# Patient Record
Sex: FEMALE | Race: WHITE | NOT HISPANIC OR LATINO | Employment: FULL TIME | URBAN - METROPOLITAN AREA
[De-identification: names, ages, dates, MRNs, and addresses within clinical notes are randomized per-mention and may not be internally consistent; named-entity substitution may affect disease eponyms.]

---

## 2017-09-26 ENCOUNTER — TELEPHONE (OUTPATIENT)
Dept: MEDICAL GROUP | Age: 42
End: 2017-09-26

## 2017-09-26 NOTE — TELEPHONE ENCOUNTER
Called Jessica Ojeda and left a message to return my call regarding his/her upcoming NP appointment with Milagro Correa M.D..   If patient returns the call please transfer them to extension: [phone]

## 2017-09-26 NOTE — TELEPHONE ENCOUNTER
NEW PATIENT VISIT PRE-VISIT PLANNING    1.  [employer] Patient is checked in Patient Demographics? YES    2.  Immunizations were updated in Epic using WebIZ?: No WebIZ record       •  Web Iz Recommendations:     3.  Is this appointment scheduled as a Hospital Follow-Up? No    4.  Patient is due for the following Health Maintenance Topics:   Health Maintenance Due   Topic Date Due   • IMM DTaP/Tdap/Td Vaccine (1 - Tdap) 03/18/1995   • IMM PNEUMOCOCCAL 19-64 (ADULT) MEDIUM RISK SERIES (1 of 1 - PPSV23) 03/18/1995   • MAMMOGRAM  03/18/2016   • IMM INFLUENZA (1) 09/01/2017       - Patient has completed Mammogram and Pap and HMR Letter faxed to:  Patient will sign a ERLIN at appointment.    5.  Reviewed/Updated the following with patient:       •   Preferred Pharmacy? YES       •   Preferred Lab? YES       •   Medications? YES. Was Abstract Encounter opened and chart updated? YES       •   Social History? YES. Was Abstract Encounter opened and chart updated? YES       •   Family History? YES. Was Abstract Encounter opened and chart updated? YES    6.  Updated Care Team?       •   DME Company (gait device, O2, CPAP, etc.) N\A       •   Other Specialists (eye doctor, derm, GYN, cardiology, endo, etc): NO    7.  Patient was informed to arrive 15 min prior to their scheduled appointment and bring in their medication bottles.

## 2017-10-03 ENCOUNTER — OFFICE VISIT (OUTPATIENT)
Dept: MEDICAL GROUP | Age: 42
End: 2017-10-03
Payer: COMMERCIAL

## 2017-10-03 VITALS
TEMPERATURE: 97.7 F | BODY MASS INDEX: 37.13 KG/M2 | DIASTOLIC BLOOD PRESSURE: 72 MMHG | WEIGHT: 201.8 LBS | SYSTOLIC BLOOD PRESSURE: 118 MMHG | OXYGEN SATURATION: 97 % | HEIGHT: 62 IN | HEART RATE: 78 BPM

## 2017-10-03 DIAGNOSIS — J45.20 MILD INTERMITTENT ASTHMA WITHOUT COMPLICATION: ICD-10-CM

## 2017-10-03 DIAGNOSIS — Z00.00 PREVENTATIVE HEALTH CARE: ICD-10-CM

## 2017-10-03 DIAGNOSIS — R87.612 LGSIL OF CERVIX OF UNDETERMINED SIGNIFICANCE: ICD-10-CM

## 2017-10-03 DIAGNOSIS — Z82.0 FAMILY HISTORY OF HUNTINGTON'S CHOREA: ICD-10-CM

## 2017-10-03 DIAGNOSIS — M25.561 ACUTE PAIN OF RIGHT KNEE: ICD-10-CM

## 2017-10-03 DIAGNOSIS — Z12.31 VISIT FOR SCREENING MAMMOGRAM: ICD-10-CM

## 2017-10-03 DIAGNOSIS — Z23 NEED FOR VACCINATION: ICD-10-CM

## 2017-10-03 DIAGNOSIS — E66.9 OBESITY (BMI 30-39.9): ICD-10-CM

## 2017-10-03 PROBLEM — R87.613 HGSIL ON CYTOLOGIC SMEAR OF CERVIX: Status: ACTIVE | Noted: 2017-10-03

## 2017-10-03 PROBLEM — R87.613 HGSIL ON CYTOLOGIC SMEAR OF CERVIX: Status: RESOLVED | Noted: 2017-10-03 | Resolved: 2017-10-03

## 2017-10-03 PROCEDURE — 90471 IMMUNIZATION ADMIN: CPT | Performed by: FAMILY MEDICINE

## 2017-10-03 PROCEDURE — 90732 PPSV23 VACC 2 YRS+ SUBQ/IM: CPT | Performed by: FAMILY MEDICINE

## 2017-10-03 PROCEDURE — 99204 OFFICE O/P NEW MOD 45 MIN: CPT | Mod: 25 | Performed by: FAMILY MEDICINE

## 2017-10-03 ASSESSMENT — PATIENT HEALTH QUESTIONNAIRE - PHQ9: CLINICAL INTERPRETATION OF PHQ2 SCORE: 0

## 2017-10-03 NOTE — ASSESSMENT & PLAN NOTE
Last pap smear one year ago LGSIL  She continues to complain of pain during coitus  Which is affecting her marriage    Endometrial curettage/cervical biopsy from 2/19/2016    Copies to:  SURGICAL PATHOLOGY CONSULTATION  FINAL DIAGNOSIS:  A. Endocervical curettings:  Benign endocervical mucosa.  No squamous mucosa.  No atypia or dysplasia identified.  B. 10 o'clock cervical biopsy:  Squamous mucosa with focal mild dysplasia/LGSIL.  C. Endometrial biopsy:  Benign proliferative endometrium, no atypia, hyperplasia, or  malignancy.  D. 12 o'clock cervical biopsy:  Minute fragments of benign squamous epithelium, one of which  demonstrates focal chronic inflammation with associated  reactive atypia. No dysplasia identified.  Comment: The history on the specimen requisition indicates a LGSIL on a  previous pap smear. This would positively correlate with the presence  of focal mild dysplasia on the 10:00 cervical biopsy.  Diagnosis performed by:  REY MYLES MD  ------------------------------------------------------------------------

## 2017-10-03 NOTE — ASSESSMENT & PLAN NOTE
The patient is a 42-year-old female who presents to clinic to establish care. She had a significant past medical history of LGSIL, family history of [place]'s chorea, mild intermittent asthma, right knee pain, obesity.   The patient denied any chest pain, no sob, no talbot, no  pnd, no orthopnea, no headache, no changes in vision, no numbness or tingling, no nausea, no diarrhea, no abdominal pain, no fevers, no chills, no bright red blood per rectum, no  difficulty urinating, no burning during micturition, no depressed mood, no other concerns.      NO Cancer  Many family members with DM    NO CAD  MGM with [employer] Chorea

## 2017-10-03 NOTE — PROGRESS NOTES
This medical record contains text that has been entered with the assistance of computer voice recognition and dictation software.  Therefore, it may contain unintended errors in text, spelling, punctuation, or grammar    Chief Complaint   Patient presents with   • Other     see reason for visit       Jessica Ojeda is a 42 y.o. female here evaluation and management of: Establish care, mild intermittent asthma, history of LGSIL, family history of Vesta's, right knee pain, obesity, pain with coitus      HPI:     Mild intermittent asthma without complication  Only excercised induced  Also worse when she has an illness      LGSIL of cervix of undetermined significance    Last pap smear one year ago LGSIL  She continues to complain of pain during coitus  Which is affecting her marriage    Endometrial curettage/cervical biopsy from 2/19/2016    Copies to:  SURGICAL PATHOLOGY CONSULTATION  FINAL DIAGNOSIS:  A. Endocervical curettings:  Benign endocervical mucosa.  No squamous mucosa.  No atypia or dysplasia identified.  B. 10 o'clock cervical biopsy:  Squamous mucosa with focal mild dysplasia/LGSIL.  C. Endometrial biopsy:  Benign proliferative endometrium, no atypia, hyperplasia, or  malignancy.  D. 12 o'clock cervical biopsy:  Minute fragments of benign squamous epithelium, one of which  demonstrates focal chronic inflammation with associated  reactive atypia. No dysplasia identified.  Comment: The history on the specimen requisition indicates a LGSIL on a  previous pap smear. This would positively correlate with the presence  of focal mild dysplasia on the 10:00 cervical biopsy.  Diagnosis performed by:  REY MYLES MD  ------------------------------------------------------------------------      Preventative health care      NO Cancer  Many family members with DM    NO CAD  MGM with [employer] Chorea    Right knee pain  Patient states for the past several months she's been having right knee pain,  associated with hearing polyps, no instability, no history of trauma. She states she has not tried anything for this, has never really addressed this with her primary care provider.    Visit for screening mammogram  Due for mammogram    Current medicines (including changes today)  Current Outpatient Prescriptions   Medication Sig Dispense Refill   • medroxyPROGESTERone (PROVERA) 10 MG Tab Take 1 Tab by mouth every day. 10 Tab 6   • albuterol (VENTOLIN OR PROVENTIL) 108 (90 BASE) MCG/ACT Aero Soln inhalation aerosol Inhale 2 Puffs by mouth every 6 hours as needed for Shortness of Breath. 8.5 g 0   • azithromycin (ZITHROMAX) 250 MG Tab 2 tabs day number one then one tab daily for remaining 4 days 6 Tab 0   • methylPREDNISolone (MEDROL DOSPACK) 4 MG Tab As directed 21 Tab 0   • Loratadine (CLARITIN PO) Take  by mouth.       • albuterol (PROVENTIL) 2.5mg/3ml NEBU 3 mL by Nebulization route every 6 hours as needed for Shortness of Breath. 25 Bullet 0   • medroxyPROGESTERone (PROVERA) 10 MG Tab Take 1 Tab by mouth every day. 10 Tab 3   • medroxyPROGESTERone (PROVERA) 10 MG Tab Take 1 Tab by mouth every day. (Patient not taking: Reported on 9/26/2017) 10 Tab 0   • methylPREDNISolone (MEDROL DOSPACK) 4 MG Tab Use PAULINE as directed (Patient not taking: Reported on 9/26/2017) 21 Tab 0   • Guaifenesin (MUCINEX MAXIMUM STRENGTH) 1200 MG TB12 Take 1 Tab by mouth 2 times a day. (Patient not taking: Reported on 9/26/2017) 15 Tab 1   • Hydrocod Polst-Chlorphen Polst (TUSSIONEX PENNKINETIC ER) 10-8 MG/5ML LQCR Take 5 mL by mouth every 12 hours as needed. No driving or operation of machinery after taking this medication. Do not exceed 10 mL in a 24-hour period. (Patient not taking: Reported on 9/26/2017) 75 mL 0   • ipratropium-albuterol (DUONEB) 0.5-2.5 (3) MG/3ML nebulizer solution 3 mL by Nebulization route every 6 hours as needed for Shortness of Breath or Wheezing. (Patient not taking: Reported on 9/26/2017) 90 mL 0   •  "methylPREDNISolone (MEDROL DOSPACK) 4 MG TABS Take  by mouth every day. follow package directions 1 Each 0   • metaxalone (SKELAXIN) 800 MG TABS Take  by mouth. Prn q 6 hrs       No current facility-administered medications for this visit.      She  has a past medical history of ASTHMA; Bronchitis; Hay fever; and Pneumonia.  She  has no past surgical history on file.  Social History   Substance Use Topics   • Smoking status: Current Every Day Smoker     Packs/day: 0.50     Years: 25.00     Types: Cigarettes   • Smokeless tobacco: Former User     Quit date: 2016      Comment: e-cigarettes   • Alcohol use 0.0 oz/week      Comment: once a year     Social History     Social History Narrative   • No narrative on file     Family History   Problem Relation Age of Onset   • Heart Disease Mother    • Hypertension Mother    • Other Mother      Kidney Disease   • Diabetes Mother    • Diabetes Brother    • Other Maternal Grandmother      Vesta's Chorea     Family Status   Relation Status   • Brother Alive   • Brother Alive   • Father Alive   • Mother Alive   • Brother Alive   • Maternal Grandmother    • Maternal Grandfather    • Paternal Grandmother    • Paternal Grandfather          ROS    Please see hpi     All other systems reviewed and are negative     Objective:     Blood pressure 118/72, pulse 78, temperature 36.5 °C (97.7 °F), height 1.575 m (5' 2.01\"), weight 91.5 kg (201 lb 12.8 oz), SpO2 97 %. Body mass index is 36.9 kg/m².  Physical Exam:    Constitutional: Alert, no distress.  Skin: Warm, dry, good turgor, no rashes in visible areas.  Eye: Equal, round and reactive, conjunctiva clear, lids normal.  ENMT: Lips without lesions, good dentition, oropharynx clear.  Neck: Trachea midline, no masses, no thyromegaly. No cervical or supraclavicular lymphadenopathy.  Respiratory: Unlabored respiratory effort, lungs clear to auscultation, no wheezes, no ronchi.  Cardiovascular: Normal " "S1, S2, no murmur, no edema.  Abdomen: Soft, non-tender, no masses, no hepatosplenomegaly.  Psych: Alert and oriented x3, normal affect and mood.         Right Knee  PositiveThessalay test,  Positive Yasmine,  negative anterior drawer, negative posterior drawer, negative lachmann,  no joint line tenderness,  normal gait, no asymetry of quadriceps,   negative valgus and varus stress,   Negative Pivot, negative Ely test          Assessment and Plan:   The following treatment plan was discussed      1. Need for vaccination  Given today    - Pneumococal Polysaccharide Vaccine 23-Valent =>3yo SQ/IM    2. Preventative health care  Care has been established  We need baseline labs to establish a clinical profile  We reviewed USPSTF guidelines    This patient is due for mammogram  Requested Medical records to be sent to us  Denies intimate partner viloence        3. Mild intermittent asthma without complication  Discussed the goals of treatment,    #1Routine monitoring of symptoms and lung function--Peak expiratory flow rate (PEFR) (performed in the office and/or at home) and spirometry (performed in the office)  #2 Patient education on Asthma Action plan  #3 Controlling environmental factors (trigger factors) and comorbid conditions that contribute to asthma severity  #4 Pharmacologic therapy--Rescue inhaler as needed and daily Advair  #5 Well-controlled asthma is characterized by daytime symptoms no more than twice per week and nighttime symptoms no more than twice per month.  #6Asthma action plan -- The patient's normal PEFR value can be used to maintain personalized \"asthma action plan\"  #7The identification and avoidance of asthma \"triggers\" is a critical component of successful asthma management  #8 Non-selective beta-blockers can trigger severe asthmatic attacks        4. Obesity (BMI 30-39.9)    We discussed agressive lifestyle modifications with weight loss, aerobic exercise, and eating a prudent diet, which  " included avoiding fried foods, using low-fat milk and avoiding simple carbohydrate, making a food diary. If you can't run because of pain do the stationary bike/elipticle or swim 30minutes 3 times per wk, in the beginning and then gradually increase.        6. LGSIL of cervix of undetermined significance  Needs repeat pap    - REFERRAL TO GYNECOLOGY    7. Visit for screening mammogram  Ordered    - MA-SCREEN MAMMO W/CAD-BILAT; Future    8. Family history of Massiel's chorea    - MASSIEL DISEASE MUTATION    9. Acute pain of right knee  Patient was instructed on activity modification ×2 weeks  Use the brace that  was given in clinic for 2 weeks with activity  NSAIDs when necessary  Ice when necessary and compression  Consider intraarticular steroid injection        HEALTH MAINTENANCE: due for mammogram, refused flu    Instructed to Follow up in clinic or ER for worsening symptoms, difficulty breathing, lack of expected recovery, or should new symptoms or problems arise.    Followup: Return in about 2 weeks (around 10/17/2017) for Suture Removal.       Once again this medical record contains text that has been entered with the assistance of computer voice recognition and dictation software.  Therefore, it may contain unintended errors in text, spelling, punctuation, or grammar

## 2017-10-03 NOTE — ASSESSMENT & PLAN NOTE
Patient states for the past several months she's been having right knee pain, associated with hearing polyps, no instability, no history of trauma. She states she has not tried anything for this, has never really addressed this with her primary care provider.

## 2017-10-17 ENCOUNTER — TELEPHONE (OUTPATIENT)
Dept: MEDICAL GROUP | Age: 42
End: 2017-10-17

## 2017-10-18 ENCOUNTER — OFFICE VISIT (OUTPATIENT)
Dept: MEDICAL GROUP | Age: 42
End: 2017-10-18
Payer: COMMERCIAL

## 2017-10-18 VITALS
BODY MASS INDEX: 37.28 KG/M2 | OXYGEN SATURATION: 95 % | DIASTOLIC BLOOD PRESSURE: 70 MMHG | HEART RATE: 83 BPM | SYSTOLIC BLOOD PRESSURE: 124 MMHG | TEMPERATURE: 97.9 F | HEIGHT: 62 IN | WEIGHT: 202.6 LBS

## 2017-10-18 DIAGNOSIS — L30.9 DERMATITIS: ICD-10-CM

## 2017-10-18 DIAGNOSIS — Z12.31 VISIT FOR SCREENING MAMMOGRAM: ICD-10-CM

## 2017-10-18 DIAGNOSIS — E66.9 OBESITY (BMI 30-39.9): ICD-10-CM

## 2017-10-18 DIAGNOSIS — F52.6 SEXUAL PAIN DISORDER: ICD-10-CM

## 2017-10-18 PROCEDURE — 99214 OFFICE O/P EST MOD 30 MIN: CPT | Performed by: FAMILY MEDICINE

## 2017-10-18 RX ORDER — TRIAMCINOLONE ACETONIDE 1 MG/G
OINTMENT TOPICAL
Qty: 1 TUBE | Refills: 1 | Status: SHIPPED | OUTPATIENT
Start: 2017-10-18 | End: 2021-09-23

## 2017-10-18 NOTE — ASSESSMENT & PLAN NOTE
Has severe cramping and bleeding after intercourse  This has been going on for almost a year  She believes is due to recent cervical biopsy  She states that this pain during intercourse and after is affecting her marriage. They do have a marriage counselor. She states of lubrication is not a problem.

## 2017-10-18 NOTE — TELEPHONE ENCOUNTER
ESTABLISHED PATIENT PRE-VISIT PLANNING     Note: Patient will not be contacted if there is no indication to call.     1.  Reviewed notes from the last few office visits within the medical group: Yes    2.  If any orders were placed at last visit or intended to be done for this visit (i.e. 6 mos follow-up), do we have Results/Consult Notes?        •  Labs - Labs were not ordered at last office visit.   Note: If patient appointment is for lab review and patient did not complete labs,                check with provider if OK to reschedule patient until labs completed.       •  Imaging - scheduled       •  Referrals - No referrals were ordered at last office visit.    3. Is this appointment scheduled as a Hospital Follow-Up? No    4.  Immunizations were updated in Epic using WebIZ?: Epic matches WebIZ       •  Web Iz Recommendations: FLU, MMR  and VARICELLA (Chicken Pox)     5.  Patient is due for the following Health Maintenance Topics:   Health Maintenance Due   Topic Date Due   • MAMMOGRAM  03/18/2016           6.  Patient was NOT informed to arrive 15 min prior to their scheduled appointment and bring in their medication bottles.

## 2017-10-18 NOTE — PROGRESS NOTES
This medical record contains text that has been entered with the assistance of computer voice recognition and dictation software.  Therefore, it may contain unintended errors in text, spelling, punctuation, or grammar    Chief Complaint   Patient presents with   • Other     see reason for visit       Jessica Ojeda is a 42 y.o. female here evaluation and management of: sexual pain disorder      HPI:     Sexual pain disorder  Has severe cramping and bleeding after intercourse  This has been going on for almost a year  She believes is due to recent cervical biopsy  She states that this pain during intercourse and after is affecting her marriage. They do have a marriage counselor. She states of lubrication is not a problem.      Dermatitis  The patient states that she's had this itchy skin/flat wart or bug bite for a week now. It itches it's on the palm of her hand right hand and she would like to have it destroyed.    Visit for screening mammogram  Patient states that she has made an appointment schedule her mammogram.    Current medicines (including changes today)  Current Outpatient Prescriptions   Medication Sig Dispense Refill   • triamcinolone acetonide (KENALOG) 0.1 % Ointment Aaa bid prn 1 Tube 1   • medroxyPROGESTERone (PROVERA) 10 MG Tab Take 1 Tab by mouth every day. 10 Tab 6   • albuterol (VENTOLIN OR PROVENTIL) 108 (90 BASE) MCG/ACT Aero Soln inhalation aerosol Inhale 2 Puffs by mouth every 6 hours as needed for Shortness of Breath. 8.5 g 0   • azithromycin (ZITHROMAX) 250 MG Tab 2 tabs day number one then one tab daily for remaining 4 days 6 Tab 0   • methylPREDNISolone (MEDROL DOSPACK) 4 MG Tab As directed 21 Tab 0   • Loratadine (CLARITIN PO) Take  by mouth.       • albuterol (PROVENTIL) 2.5mg/3ml NEBU 3 mL by Nebulization route every 6 hours as needed for Shortness of Breath. 25 Bullet 0   • medroxyPROGESTERone (PROVERA) 10 MG Tab Take 1 Tab by mouth every day. 10 Tab 3   • medroxyPROGESTERone  (PROVERA) 10 MG Tab Take 1 Tab by mouth every day. (Patient not taking: Reported on 9/26/2017) 10 Tab 0   • methylPREDNISolone (MEDROL DOSPACK) 4 MG Tab Use PAULINE as directed (Patient not taking: Reported on 9/26/2017) 21 Tab 0   • Guaifenesin (MUCINEX MAXIMUM STRENGTH) 1200 MG TB12 Take 1 Tab by mouth 2 times a day. (Patient not taking: Reported on 9/26/2017) 15 Tab 1   • Hydrocod Polst-Chlorphen Polst (TUSSIONEX PENNKINETIC ER) 10-8 MG/5ML LQCR Take 5 mL by mouth every 12 hours as needed. No driving or operation of machinery after taking this medication. Do not exceed 10 mL in a 24-hour period. (Patient not taking: Reported on 9/26/2017) 75 mL 0   • ipratropium-albuterol (DUONEB) 0.5-2.5 (3) MG/3ML nebulizer solution 3 mL by Nebulization route every 6 hours as needed for Shortness of Breath or Wheezing. (Patient not taking: Reported on 9/26/2017) 90 mL 0   • methylPREDNISolone (MEDROL DOSPACK) 4 MG TABS Take  by mouth every day. follow package directions 1 Each 0   • metaxalone (SKELAXIN) 800 MG TABS Take  by mouth. Prn q 6 hrs       No current facility-administered medications for this visit.      She  has a past medical history of ASTHMA; Bronchitis; Hay fever; and Pneumonia.  She  has no past surgical history on file.  Social History   Substance Use Topics   • Smoking status: Current Every Day Smoker     Packs/day: 0.50     Years: 25.00     Types: Cigarettes   • Smokeless tobacco: Former User     Quit date: 11/23/2016      Comment: e-cigarettes   • Alcohol use 0.0 oz/week      Comment: once a year     Social History     Social History Narrative   • No narrative on file     Family History   Problem Relation Age of Onset   • Heart Disease Mother    • Hypertension Mother    • Other Mother      Kidney Disease   • Diabetes Mother    • Diabetes Brother    • Other Maternal Grandmother      Gallia's Chorea     Family Status   Relation Status   • Brother Alive   • Brother Alive   • Father Alive   • Mother Alive   •  "Brother Alive   • Maternal Grandmother    • Maternal Grandfather    • Paternal Grandmother    • Paternal Grandfather          ROS    Please see hpi     All other systems reviewed and are negative     Objective:     Blood pressure 124/70, pulse 83, temperature 36.6 °C (97.9 °F), height 1.575 m (5' 2.01\"), weight 91.9 kg (202 lb 9.6 oz), SpO2 95 %. Body mass index is 37.05 kg/m².  Physical Exam:    Constitutional: Alert, no distress.  Skin: Warm, dry, good turgor, no rashes in visible areas.  Eye: Equal, round and reactive, conjunctiva clear, lids normal.  ENMT: Lips without lesions, good dentition, oropharynx clear.  Neck: Trachea midline, no masses, no thyromegaly. No cervical or supraclavicular lymphadenopathy.  Respiratory: Unlabored respiratory effort, lungs clear to auscultation, no wheezes, no ronchi.  Cardiovascular: Normal S1, S2, no murmur, no edema.  Abdomen: Soft, non-tender, no masses, no hepatosplenomegaly.  Psych: Alert and oriented x3, normal affect and mood.          Assessment and Plan:   The following treatment plan was discussed      1. Obesity (BMI 30-39.9)    We discussed subsequent randomized trials, weight loss (via lifestyle or pharmacologic therapy) has been shown to reduce morbidity (reduction in risk factors for cardiovascular disease     The frequently cited Diabetes Prevention Program (DPP) significantly reduced the rate of progression from impaired glucose tolerance to diabetes over a three-year period in participants randomized to intensive lifestyle modification focusing on weight loss       We also discussed agressive lifestyle modifications with weight loss, aerobic exercise, and eating a prudent diet, which  included avoiding fried foods, using low-fat milk and avoiding simple carbohydrate, making a food diary. If you can't run because of pain do the stationary bike/elipticle or swim 30minutes 3 times per wk, in the beginning and then gradually " increase.      2. Visit for screening mammogram  Scheduled per patient     3. Sexual pain disorder  She will schedule  A visit with OB  - REFERRAL TO GYNECOLOGY    4. Dermatitis  Topical kenalog ointment  - triamcinolone acetonide (KENALOG) 0.1 % Ointment; Aaa bid prn  Dispense: 1 Tube; Refill: 1        HEALTH MAINTENANCE:    Instructed to Follow up in clinic or ER for worsening symptoms, difficulty breathing, lack of expected recovery, or should new symptoms or problems arise.    Followup: Return in about 2 months (around 12/18/2017) for Reevaluation.       Once again this medical record contains text that has been entered with the assistance of computer voice recognition and dictation software.  Therefore, it may contain unintended errors in text, spelling, punctuation, or grammar

## 2017-10-18 NOTE — ASSESSMENT & PLAN NOTE
The patient states that she's had this itchy skin/flat wart or bug bite for a week now. It itches it's on the palm of her hand right hand and she would like to have it destroyed.

## 2017-10-31 ENCOUNTER — HOSPITAL ENCOUNTER (OUTPATIENT)
Dept: RADIOLOGY | Facility: MEDICAL CENTER | Age: 42
End: 2017-10-31
Attending: FAMILY MEDICINE
Payer: COMMERCIAL

## 2017-10-31 DIAGNOSIS — Z12.31 VISIT FOR SCREENING MAMMOGRAM: ICD-10-CM

## 2017-10-31 PROCEDURE — G0202 SCR MAMMO BI INCL CAD: HCPCS

## 2017-11-01 DIAGNOSIS — R92.8 ABNORMAL MAMMOGRAM: ICD-10-CM

## 2017-11-02 ENCOUNTER — TELEPHONE (OUTPATIENT)
Dept: MEDICAL GROUP | Age: 42
End: 2017-11-02

## 2017-11-02 NOTE — LETTER
November 7, 2017        Jessica Ojeda  [address] [place] [address]        Dear Jessica:    Dr. Correa's office has been unable to reach you. Please call our office at [phone]. Thank you.      If you have any questions or concerns, please don't hesitate to call.        Sincerely,        Felix Connors Ass't      Electronically Signed

## 2017-11-02 NOTE — TELEPHONE ENCOUNTER
----- Message from Kb Humphrey M.D. sent at 11/1/2017  2:42 PM PDT -----  Please call patient to inform her that the radiologist recommends getting a better look at the left breast with an ultrasound.   There is nothing to panic about this is just to get a better image.  Order placed.       Kb Correa MD  [facility], [address] [address]

## 2017-11-02 NOTE — TELEPHONE ENCOUNTER
Phone Number Called: [phone] (home)     Message: Left message for the patient to call us back regarding the note below.    Left Message for patient to call back: yes

## 2017-11-03 NOTE — TELEPHONE ENCOUNTER
Phone Number Called: [phone] (home)     Message: called pt left message for pt to call back.     Left Message for patient to call back: yes

## 2017-11-07 ENCOUNTER — HOSPITAL ENCOUNTER (OUTPATIENT)
Dept: RADIOLOGY | Facility: MEDICAL CENTER | Age: 42
End: 2017-11-07
Attending: FAMILY MEDICINE
Payer: COMMERCIAL

## 2017-11-07 DIAGNOSIS — R92.8 ABNORMAL MAMMOGRAM: ICD-10-CM

## 2017-11-07 PROCEDURE — G0206 DX MAMMO INCL CAD UNI: HCPCS | Mod: LT

## 2017-11-07 PROCEDURE — 76642 ULTRASOUND BREAST LIMITED: CPT | Mod: LT

## 2017-12-19 ENCOUNTER — OFFICE VISIT (OUTPATIENT)
Dept: MEDICAL GROUP | Facility: PHYSICIAN GROUP | Age: 42
End: 2017-12-19
Payer: COMMERCIAL

## 2017-12-19 VITALS
OXYGEN SATURATION: 94 % | RESPIRATION RATE: 20 BRPM | BODY MASS INDEX: 35.7 KG/M2 | HEART RATE: 94 BPM | SYSTOLIC BLOOD PRESSURE: 122 MMHG | HEIGHT: 62 IN | DIASTOLIC BLOOD PRESSURE: 90 MMHG | TEMPERATURE: 99.2 F | WEIGHT: 194 LBS

## 2017-12-19 DIAGNOSIS — J40 BRONCHITIS: ICD-10-CM

## 2017-12-19 DIAGNOSIS — R52 BODY ACHES: ICD-10-CM

## 2017-12-19 LAB
FLUAV+FLUBV AG SPEC QL IA: NEGATIVE
INT CON NEG: NEGATIVE
INT CON POS: POSITIVE

## 2017-12-19 PROCEDURE — 87804 INFLUENZA ASSAY W/OPTIC: CPT | Performed by: INTERNAL MEDICINE

## 2017-12-19 PROCEDURE — 99203 OFFICE O/P NEW LOW 30 MIN: CPT | Mod: 25 | Performed by: INTERNAL MEDICINE

## 2017-12-19 PROCEDURE — 94640 AIRWAY INHALATION TREATMENT: CPT | Performed by: INTERNAL MEDICINE

## 2017-12-19 RX ORDER — IPRATROPIUM BROMIDE AND ALBUTEROL SULFATE 2.5; .5 MG/3ML; MG/3ML
3 SOLUTION RESPIRATORY (INHALATION) ONCE
Status: COMPLETED | OUTPATIENT
Start: 2017-12-19 | End: 2017-12-19

## 2017-12-19 RX ORDER — BENZONATATE 100 MG/1
100 CAPSULE ORAL 3 TIMES DAILY PRN
Qty: 30 CAP | Refills: 0 | Status: SHIPPED | OUTPATIENT
Start: 2017-12-19 | End: 2018-09-20

## 2017-12-19 RX ADMIN — IPRATROPIUM BROMIDE AND ALBUTEROL SULFATE 3 ML: 2.5; .5 SOLUTION RESPIRATORY (INHALATION) at 15:47

## 2017-12-19 NOTE — ASSESSMENT & PLAN NOTE
Has bouts of bronchitis every year. Symptoms started yesterday and started feeling feverish. Around 10 pm last night started coughing. Cough is dry. Had diarrhea all day yesterday. Had constant diarrhea, watery, this morning with burning. Had some blood with wiping from chafing from the constant wiping. Took immodium with some relief of her diarrhea. Denies vomiting. Having subjective fevers and chills. Feels like she's under water. Didn't get her flu shot this year. Having violent dry cough with shortness of breath after. Has been using her inhaler (used it twice yesterday and 4-5 times today) with some relief. Her son was sick a week ago.

## 2017-12-19 NOTE — PROGRESS NOTES
PRIMARY CARE CLINIC FOLLOW UP VISIT  Chief Complaint   Patient presents with   • Cough     pt gets bronchitis every year    • Diarrhea   • Sweats     History of Present Illness     Bronchitis  Has bouts of bronchitis every year. Symptoms started yesterday and started feeling feverish. Around 10 pm last night started coughing. Cough is dry. Had diarrhea all day yesterday. Had constant diarrhea, watery, this morning with burning. Had some blood with wiping from chafing from the constant wiping. Took immodium with some relief of her diarrhea. Denies vomiting. Having subjective fevers and chills. Feels like she's under water. Didn't get her flu shot this year. Having violent dry cough with shortness of breath after. Has been using her inhaler (used it twice yesterday and 4-5 times today) with some relief. Her son was sick a week ago.     Current Outpatient Prescriptions   Medication Sig Dispense Refill   • benzonatate (TESSALON) 100 MG Cap Take 1 Cap by mouth 3 times a day as needed for Cough. 30 Cap 0   • albuterol (VENTOLIN OR PROVENTIL) 108 (90 BASE) MCG/ACT Aero Soln inhalation aerosol Inhale 2 Puffs by mouth every 6 hours as needed for Shortness of Breath. 8.5 g 0   • Loratadine (CLARITIN PO) Take  by mouth.       • albuterol (PROVENTIL) 2.5mg/3ml NEBU 3 mL by Nebulization route every 6 hours as needed for Shortness of Breath. 25 Bullet 0   • triamcinolone acetonide (KENALOG) 0.1 % Ointment Aaa bid prn 1 Tube 1   • medroxyPROGESTERone (PROVERA) 10 MG Tab Take 1 Tab by mouth every day. (Patient not taking: Reported on 9/26/2017) 10 Tab 0   • methylPREDNISolone (MEDROL DOSPACK) 4 MG Tab Use PAULINE as directed (Patient not taking: Reported on 9/26/2017) 21 Tab 0   • Guaifenesin (MUCINEX MAXIMUM STRENGTH) 1200 MG TB12 Take 1 Tab by mouth 2 times a day. (Patient not taking: Reported on 9/26/2017) 15 Tab 1   • Hydrocod Polst-Chlorphen Polst (TUSSIONEX PENNKINETIC ER) 10-8 MG/5ML LQCR Take 5 mL by mouth every 12 hours as  "needed. No driving or operation of machinery after taking this medication. Do not exceed 10 mL in a 24-hour period. (Patient not taking: Reported on 2017) 75 mL 0   • ipratropium-albuterol (DUONEB) 0.5-2.5 (3) MG/3ML nebulizer solution 3 mL by Nebulization route every 6 hours as needed for Shortness of Breath or Wheezing. (Patient not taking: Reported on 2017) 90 mL 0   • methylPREDNISolone (MEDROL DOSPACK) 4 MG TABS Take  by mouth every day. follow package directions 1 Each 0   • metaxalone (SKELAXIN) 800 MG TABS Take  by mouth. Prn q 6 hrs       No current facility-administered medications for this visit.      Past Medical History:   Diagnosis Date   • ASTHMA    • Bronchitis    • Hay fever    • Pneumonia     as child     Past Surgical History:   Procedure Laterality Date   • PB ENLARGE BREAST WITH IMPLANT       Social History   Substance Use Topics   • Smoking status: Current Every Day Smoker     Packs/day: 0.50     Years: 25.00     Types: Cigarettes   • Smokeless tobacco: Former User     Quit date: 2016      Comment: e-cigarettes   • Alcohol use 0.0 oz/week      Comment: once a year     Social History     Social History Narrative   • No narrative on file     Family History   Problem Relation Age of Onset   • Heart Disease Mother    • Hypertension Mother    • Other Mother      Kidney Disease   • Diabetes Mother    • Diabetes Brother    • Other Maternal Grandmother      [place]'s Chorea     Family Status   Relation Status   • Brother Alive   • Brother Alive   • Father Alive   • Mother Alive   • Brother Alive   • Maternal Grandmother    • Maternal Grandfather    • Paternal Grandmother    • Paternal Grandfather      Allergies: Pcn [penicillins]    ROS  As per HPI above. All other systems reviewed and negative.        Objective   Blood pressure 122/90, pulse 94, temperature 37.3 °C (99.2 °F), resp. rate 20, height 1.575 m (5' 2\"), weight 88 kg (194 lb), SpO2 94 %. " Body mass index is 35.48 kg/m².    General: alert and oriented, appears acutely ill   HEENT: Normocephalic, atraumatic. No thyroid masses. Oropharynx clear with mild injection    Cardiovascular: regular rate and rhythm, normal S1/S2  Pulmonary: lungs clear to auscultation bilaterally, interrupted by dry coughing fits  Lymphatics: no cervical or supraclavicular lymphadenopathy   Skin: warm and dry, no lesions or rashes  Psychiatric: appropriate mood and affect. Good insight and appropriate judgment     Assessment and Plan   The following treatment plan was discussed     1. Bronchitis  Flu swab negative. Offered reassurance and that antibiotics are more harmful than beneficial; discussed risks including clostridium difficile. Given neb in office but told her to limit use at home. Encouraged rest, aggressive hydration. Given prescription for tessalon 100 mg tid prn. Encouraged smoking cessation   - ipratropium-albuterol (DUONEB) nebulizer solution 3 mL; 3 mL by Nebulization route Once.    Healthcare maintenance     There are no preventive care reminders to display for this patient.    Return if symptoms worsen or fail to improve.    Ignacio Houston MD  Internal Medicine  [facility]

## 2017-12-20 NOTE — PATIENT INSTRUCTIONS
· Be sure to rest and hydrate aggressively  · You have been prescribed a medication called tessalon which you may take up to three times a day for cough

## 2018-01-02 ENCOUNTER — HOSPITAL ENCOUNTER (OUTPATIENT)
Facility: MEDICAL CENTER | Age: 43
End: 2018-01-02
Attending: OBSTETRICS & GYNECOLOGY
Payer: COMMERCIAL

## 2018-01-02 ENCOUNTER — GYNECOLOGY VISIT (OUTPATIENT)
Dept: OBGYN | Facility: MEDICAL CENTER | Age: 43
End: 2018-01-02
Payer: COMMERCIAL

## 2018-01-02 VITALS
HEIGHT: 62 IN | WEIGHT: 193 LBS | DIASTOLIC BLOOD PRESSURE: 84 MMHG | BODY MASS INDEX: 35.51 KG/M2 | SYSTOLIC BLOOD PRESSURE: 128 MMHG

## 2018-01-02 DIAGNOSIS — R10.2 PELVIC PAIN IN FEMALE: ICD-10-CM

## 2018-01-02 DIAGNOSIS — Z11.51 SCREENING FOR HUMAN PAPILLOMAVIRUS (HPV): ICD-10-CM

## 2018-01-02 DIAGNOSIS — Z01.419 WELL WOMAN EXAM WITH ROUTINE GYNECOLOGICAL EXAM: ICD-10-CM

## 2018-01-02 DIAGNOSIS — Z12.4 ROUTINE CERVICAL SMEAR: ICD-10-CM

## 2018-01-02 PROCEDURE — 87624 HPV HI-RISK TYP POOLED RSLT: CPT

## 2018-01-02 PROCEDURE — 99396 PREV VISIT EST AGE 40-64: CPT | Performed by: OBSTETRICS & GYNECOLOGY

## 2018-01-02 PROCEDURE — 88175 CYTOPATH C/V AUTO FLUID REDO: CPT

## 2018-01-03 DIAGNOSIS — Z11.51 SCREENING FOR HUMAN PAPILLOMAVIRUS (HPV): ICD-10-CM

## 2018-01-03 DIAGNOSIS — Z01.419 WELL WOMAN EXAM WITH ROUTINE GYNECOLOGICAL EXAM: ICD-10-CM

## 2018-01-03 DIAGNOSIS — Z12.4 ROUTINE CERVICAL SMEAR: ICD-10-CM

## 2018-01-03 NOTE — PROGRESS NOTES
ANNUAL  Gynecologic Exam      HPI Comments:   42 year old  presents for well woman exam.   Patient's last menstrual period was 2017.  Periods are 28 day cycle lasting 5-7 days  (+) dyspareunia past 2 years. Sharp pain in her vagina during intercourse. (+) dryness  (+) menstrual cramps every other cycle  Non smoker  No method of BC now. She has a 19 year old son  Denies family history of breast/ovarian cancer    Review of Systems   Pertinent positives documented in HPI and all other systems reviewed & are negative    All PMH, PSH, allergies, social history and FH reviewed and updated today:  Past Medical History:   Diagnosis Date   • ASTHMA    • Bronchitis    • Hay fever    • Pneumonia     as child     Past Surgical History:   Procedure Laterality Date   • PB ENLARGE BREAST WITH IMPLANT       Pcn [penicillins]  Social History     Social History   • Marital status: Single     Spouse name: N/A   • Number of children: N/A   • Years of education: N/A     Social History Main Topics   • Smoking status: Current Every Day Smoker     Packs/day: 0.50     Years: 25.00     Types: Cigarettes   • Smokeless tobacco: Former User     Quit date: 2016      Comment: e-cigarettes   • Alcohol use 0.0 oz/week      Comment: once a year   • Drug use: No   • Sexual activity: Yes     Partners: Male     Other Topics Concern   • Not on file     Social History Narrative   • No narrative on file     Family History   Problem Relation Age of Onset   • Heart Disease Mother    • Hypertension Mother    • Other Mother      Kidney Disease   • Diabetes Mother    • Diabetes Brother    • Other Maternal Grandmother      [place]'s Chorea     Medications:   Current Outpatient Prescriptions Ordered in [facility]   Medication Sig Dispense Refill   • albuterol (VENTOLIN OR PROVENTIL) 108 (90 BASE) MCG/ACT Aero Soln inhalation aerosol Inhale 2 Puffs by mouth every 6 hours as needed for Shortness of Breath. 8.5 g 0   • albuterol (PROVENTIL)  "2.5mg/3ml NEBU 3 mL by Nebulization route every 6 hours as needed for Shortness of Breath. 25 Bullet 0   • benzonatate (TESSALON) 100 MG Cap Take 1 Cap by mouth 3 times a day as needed for Cough. 30 Cap 0   • triamcinolone acetonide (KENALOG) 0.1 % Ointment Aaa bid prn 1 Tube 1   • medroxyPROGESTERone (PROVERA) 10 MG Tab Take 1 Tab by mouth every day. (Patient not taking: Reported on 9/26/2017) 10 Tab 0   • methylPREDNISolone (MEDROL DOSPACK) 4 MG Tab Use PAULINE as directed (Patient not taking: Reported on 9/26/2017) 21 Tab 0   • Loratadine (CLARITIN PO) Take  by mouth.       • Guaifenesin (MUCINEX MAXIMUM STRENGTH) 1200 MG TB12 Take 1 Tab by mouth 2 times a day. (Patient not taking: Reported on 9/26/2017) 15 Tab 1   • Hydrocod Polst-Chlorphen Polst (TUSSIONEX PENNKINETIC ER) 10-8 MG/5ML LQCR Take 5 mL by mouth every 12 hours as needed. No driving or operation of machinery after taking this medication. Do not exceed 10 mL in a 24-hour period. (Patient not taking: Reported on 9/26/2017) 75 mL 0   • ipratropium-albuterol (DUONEB) 0.5-2.5 (3) MG/3ML nebulizer solution 3 mL by Nebulization route every 6 hours as needed for Shortness of Breath or Wheezing. (Patient not taking: Reported on 9/26/2017) 90 mL 0   • methylPREDNISolone (MEDROL DOSPACK) 4 MG TABS Take  by mouth every day. follow package directions 1 Each 0   • metaxalone (SKELAXIN) 800 MG TABS Take  by mouth. Prn q 6 hrs       No current [facility]-ordered facility-administered medications on file.           Objective:   Vital measurements:  Blood pressure 128/84, height 1.575 m (5' 2\"), weight 87.5 kg (193 lb), last menstrual period 12/20/2017, not currently breastfeeding.  Body mass index is 35.3 kg/m². (Goal BM I>18 <25)    Physical Exam   Nursing note and vitals reviewed.  Constitutional: She is oriented to person, place, and time. She appears well-developed and well-nourished. No distress.     HEENT:   Head: Normocephalic and atraumatic.   Right Ear: External ear " normal.   Left Ear: External ear normal.   Nose: Nose normal.   Eyes: Conjunctivae and EOM are normal. Pupils are equal, round, and reactive to light. No scleral icterus.     Neck: Normal range of motion. Neck supple. No tracheal deviation present. No thyromegaly present.     Pulmonary/Chest: Effort normal and breath sounds normal. No respiratory distress. She has no wheezes. She has no rales. She exhibits no tenderness.     Cardiovascular: Regular, rate and rhythm. No JVD.    Abdominal: Soft. Bowel sounds are normal. She exhibits no distension and no mass. No tenderness. She has no rebound and no guarding.     Breast:  (+) breast implants    Genitourinary:  Pelvic exam was performed with patient supine.  External genitalia with no abnormal pigmentation, labial fusion,rash, tenderness, lesion or injury to the labia bilaterally.  Vagina is dry with no lesions, foul discharge, erythema, tenderness or bleeding. No foreign body around the vagina or signs of injury.   Cervix exhibits no motion tenderness, no discharge and no friability.   Uterus and adnexa - difficult to palpate because of body habitus, mild tenderness to deep palpation RLQ, no rebound    Musculoskeletal: Normal range of motion. She exhibits no edema and no tenderness.     Lymphadenopathy: She has no cervical adenopathy.     Neurological: She is alert and oriented to person, place, and time. She exhibits normal muscle tone.     Skin: Skin is warm and dry. No rash noted. She is not diaphoretic. No erythema. No pallor.     Psychiatric: She has a normal mood and affect. Her behavior is normal. Judgment and thought content normal  Assessment:     1. Well woman exam with routine gynecological exam  ThinPrep Pap, w/HPV rflx to genotype    LIPID PANEL    TSH    CMP (12)    HEMOGLOBIN A1C    VITAMIN D,25 HYDROXY   2. Screening for human papillomavirus (HPV)  ThinPrep Pap, w/HPV rflx to genotype   3. Routine cervical smear  ThinPrep Pap, w/HPV rflx to genotype    4. Pelvic pain in female  US-GYN-PELVIS TRANSVAGINAL     Plan:   Pap and physical exam performed. Yearly pap  Monthly SBE.  Vaginal moisturizer  Counseling: breast self exam, mammography screening, STD prevention, HIV risk factors and prevention and family planning choices  Encourage exercise and proper diet. Weight loss  Mammograms annually.  See medications and orders placed in encounter report.

## 2018-01-04 LAB
CYTOLOGY REG CYTOL: ABNORMAL
HPV HR 12 DNA CVX QL NAA+PROBE: POSITIVE
HPV16 DNA SPEC QL NAA+PROBE: NEGATIVE
HPV18 DNA SPEC QL NAA+PROBE: NEGATIVE
SPECIMEN SOURCE: ABNORMAL

## 2018-01-08 DIAGNOSIS — R87.611 PAP SMEAR OF CERVIX WITH ASCUS, CANNOT EXCLUDE HGSIL: ICD-10-CM

## 2018-02-06 ENCOUNTER — GYNECOLOGY VISIT (OUTPATIENT)
Dept: OBGYN | Facility: MEDICAL CENTER | Age: 43
End: 2018-02-06
Payer: COMMERCIAL

## 2018-02-06 ENCOUNTER — HOSPITAL ENCOUNTER (OUTPATIENT)
Facility: MEDICAL CENTER | Age: 43
End: 2018-02-06
Attending: OBSTETRICS & GYNECOLOGY
Payer: COMMERCIAL

## 2018-02-06 DIAGNOSIS — R87.611 PAP SMEAR OF CERVIX WITH ASCUS, CANNOT EXCLUDE HGSIL: ICD-10-CM

## 2018-02-06 PROBLEM — J40 BRONCHITIS: Status: RESOLVED | Noted: 2017-12-19 | Resolved: 2018-02-06

## 2018-02-06 PROCEDURE — 88305 TISSUE EXAM BY PATHOLOGIST: CPT

## 2018-02-06 PROCEDURE — 57454 BX/CURETT OF CERVIX W/SCOPE: CPT | Performed by: OBSTETRICS & GYNECOLOGY

## 2018-02-06 NOTE — LETTER
COLPOSCOPY / LEEP DATA SHEET    Jessica Ojeda     1975      42 y.o.      No LMP recorded.     @EDC@       Medical history:   o MVP    o Blood dyscrasia    o Rh     o Other: .    STD history:    o HPV     o HSV     o HIV     o GC     o Chlamydia     o None     o Other: .    HIV:   o Positive     o Negative                            IV Drug User:   o  Yes    o  No .    Age of first coitus:                                                Number of sex partners in lifetime:  .    Reason for exam:.    Prior abnormal PAPS?    o  Yes  o  No        Treatment: .    Prior history of condyloma?    o  Yes  o  No        Treatment:.     Colposcopic view - description:                                 Satisfactory?    o  Yes  o  No                    Squamo-columnar junction seen?  o  Yes  o  No.    Entire lesion seen?     o  Yes  o  No          PAP done?  o  Yes  o  No           Biopsies done?  o  Yes  o  No.    Biopsy sites:.    ECC done?    o  Yes  o  No      If no, reason:.    Impression:.    Recommendations:.             Colposcopist: ______________________________________________ Date: ___________________

## 2018-02-06 NOTE — PROGRESS NOTES
Chief Complaint   Patient presents with   • Other     Colposcopy       History of present illness:   42 y.o. With ASCUS - r/o high grade presents for colpo  Doing well  No questions about the procedure. She had colpo 1-2 years ago for LGSIL  smoker    Review of systems:  Pertinent positives documented in HPI and all other systems reviewed & are negative    All PMH, PSH, allergies, social history and FH reviewed and updated today:  Past Medical History:   Diagnosis Date   • ASTHMA    • Bronchitis    • Hay fever    • Pneumonia     as child       Past Surgical History:   Procedure Laterality Date   • PB ENLARGE BREAST WITH IMPLANT         Allergies:   Allergies   Allergen Reactions   • Pcn [Penicillins]        Social History     Social History   • Marital status: Single     Spouse name: N/A   • Number of children: N/A   • Years of education: N/A     Occupational History   • Not on file.     Social History Main Topics   • Smoking status: Current Every Day Smoker     Packs/day: 0.50     Years: 25.00     Types: Cigarettes   • Smokeless tobacco: Former User     Quit date: 11/23/2016      Comment: e-cigarettes   • Alcohol use 0.0 oz/week      Comment: once a year   • Drug use: No   • Sexual activity: Yes     Partners: Male     Other Topics Concern   • Not on file     Social History Narrative   • No narrative on file       Family History   Problem Relation Age of Onset   • Heart Disease Mother    • Hypertension Mother    • Other Mother      Kidney Disease   • Diabetes Mother    • Diabetes Brother    • Other Maternal Grandmother      [place]'s Chorea       Physical exam:  There were no vitals taken for this visit.    General:appears stated age, is in no apparent distress, is well developed and well nourished  Head: normocephalic, non-tender  Abdomen: Bowel sounds positive, nondistended, soft, nontender x4, no rebound or guarding. No organomegaly. No masses.  Skin: No rashes, or ulcers or lesions seen  Psychiatric: Patient  shows appropriate affect, is alert and oriented x3, intact judgment and insight.      1. Pap smear of cervix with ASCUS, cannot exclude HGSIL  Consent for Surgery / Procedure    POCT Pregnancy    PATHOLOGY SPECIMEN   2. See colpo sheet  3. Cx biopsies and ECC done. Cervix hemostatic after the procedure. Pt tolerated the procedure well  4. Await pathology  5. SMOKING CESSATION COUNSELING

## 2018-02-15 DIAGNOSIS — D06.9 CIN III WITH SEVERE DYSPLASIA: ICD-10-CM

## 2018-02-24 LAB
25(OH)D3+25(OH)D2 SERPL-MCNC: 6.7 NG/ML (ref 30–100)
ALBUMIN SERPL-MCNC: 4.1 G/DL (ref 3.5–5.5)
ALBUMIN/GLOB SERPL: 1.5 {RATIO} (ref 1.2–2.2)
ALP SERPL-CCNC: 91 IU/L (ref 39–117)
AST SERPL-CCNC: 21 IU/L (ref 0–40)
BILIRUB SERPL-MCNC: <0.2 MG/DL (ref 0–1.2)
BUN SERPL-MCNC: 8 MG/DL (ref 6–24)
BUN/CREAT SERPL: 8 (ref 9–23)
CALCIUM SERPL-MCNC: 9.3 MG/DL (ref 8.7–10.2)
CHLORIDE SERPL-SCNC: 103 MMOL/L (ref 96–106)
CHOLEST SERPL-MCNC: 201 MG/DL (ref 100–199)
CREAT SERPL-MCNC: 1.02 MG/DL (ref 0.57–1)
GFR SERPLBLD CREATININE-BSD FMLA CKD-EPI: 68 ML/MIN/1.73
GFR SERPLBLD CREATININE-BSD FMLA CKD-EPI: 79 ML/MIN/1.73
GLOBULIN SER CALC-MCNC: 2.7 G/DL (ref 1.5–4.5)
GLUCOSE SERPL-MCNC: 101 MG/DL (ref 65–99)
HBA1C MFR BLD: 5.4 % (ref 4.8–5.6)
HDLC SERPL-MCNC: 34 MG/DL
LABORATORY COMMENT REPORT: ABNORMAL
LDLC SERPL CALC-MCNC: 121 MG/DL (ref 0–99)
POTASSIUM SERPL-SCNC: 4.2 MMOL/L (ref 3.5–5.2)
PROT SERPL-MCNC: 6.8 G/DL (ref 6–8.5)
SODIUM SERPL-SCNC: 142 MMOL/L (ref 134–144)
TRIGL SERPL-MCNC: 231 MG/DL (ref 0–149)
TSH SERPL DL<=0.005 MIU/L-ACNC: 1.42 UIU/ML (ref 0.45–4.5)
VLDLC SERPL CALC-MCNC: 46 MG/DL (ref 5–40)

## 2018-04-26 ENCOUNTER — GYNECOLOGY VISIT (OUTPATIENT)
Dept: OBGYN | Facility: MEDICAL CENTER | Age: 43
End: 2018-04-26
Payer: COMMERCIAL

## 2018-04-26 ENCOUNTER — HOSPITAL ENCOUNTER (OUTPATIENT)
Facility: MEDICAL CENTER | Age: 43
End: 2018-04-26
Attending: OBSTETRICS & GYNECOLOGY
Payer: COMMERCIAL

## 2018-04-26 VITALS
BODY MASS INDEX: 34.78 KG/M2 | WEIGHT: 189 LBS | SYSTOLIC BLOOD PRESSURE: 120 MMHG | HEIGHT: 62 IN | DIASTOLIC BLOOD PRESSURE: 80 MMHG

## 2018-04-26 DIAGNOSIS — N87.1 CERVICAL DYSPLASIA, MODERATE: ICD-10-CM

## 2018-04-26 DIAGNOSIS — D06.9 CIN III WITH SEVERE DYSPLASIA: ICD-10-CM

## 2018-04-26 LAB
INT CON NEG: NEGATIVE
INT CON POS: POSITIVE
PATHOLOGY CONSULT NOTE: NORMAL
POC URINE PREGNANCY TEST: NEGATIVE

## 2018-04-26 PROCEDURE — 57505 ENDOCERVICAL CURETTAGE: CPT | Performed by: OBSTETRICS & GYNECOLOGY

## 2018-04-26 PROCEDURE — 88305 TISSUE EXAM BY PATHOLOGIST: CPT

## 2018-04-26 PROCEDURE — 57522 CONIZATION OF CERVIX: CPT | Performed by: OBSTETRICS & GYNECOLOGY

## 2018-04-26 PROCEDURE — 88307 TISSUE EXAM BY PATHOLOGIST: CPT

## 2018-04-26 PROCEDURE — 81025 URINE PREGNANCY TEST: CPT | Performed by: OBSTETRICS & GYNECOLOGY

## 2018-04-26 NOTE — LETTER
COLPOSCOPY / LEEP DATA SHEET    Jessica Ojeda     1975      43 y.o.      Patient's last menstrual period was 03/28/2018.     @EDC@       Medical history:   o MVP    o Blood dyscrasia    o Rh     o Other: .    STD history:    o HPV     o HSV     o HIV     o GC     o Chlamydia     o None     o Other: .    HIV:   o Positive     o Negative                            IV Drug User:   o  Yes    o  No .    Age of first coitus:                                                Number of sex partners in lifetime:  .    Reason for exam:.    Prior abnormal PAPS?    o  Yes  o  No        Treatment: .    Prior history of condyloma?    o  Yes  o  No        Treatment:.     Colposcopic view - description:                                 Satisfactory?    o  Yes  o  No                    Squamo-columnar junction seen?  o  Yes  o  No.    Entire lesion seen?     o  Yes  o  No          PAP done?  o  Yes  o  No           Biopsies done?  o  Yes  o  No.    Biopsy sites:.    ECC done?    o  Yes  o  No      If no, reason:.    Impression:.    Recommendations:.             Colposcopist: ______________________________________________ Date: ___________________

## 2018-04-27 NOTE — PROGRESS NOTES
"Chief Complaint   Patient presents with   • Procedure     LEEP       History of present illness:   43 y.o. With RONALD III presents for LEEP  Doing fine  No questions about the procedure  Regular periods, no pelvic pains    Review of systems:  Pertinent positives documented in HPI and all other systems reviewed & are negative    All PMH, PSH, allergies, social history and FH reviewed and updated today:  Past Medical History:   Diagnosis Date   • ASTHMA    • Bronchitis    • Hay fever    • Pneumonia     as child       Past Surgical History:   Procedure Laterality Date   • PB ENLARGE BREAST WITH IMPLANT         Allergies:   Allergies   Allergen Reactions   • Pcn [Penicillins]        Social History     Social History   • Marital status: Single     Spouse name: N/A   • Number of children: N/A   • Years of education: N/A     Occupational History   • Not on file.     Social History Main Topics   • Smoking status: Current Every Day Smoker     Packs/day: 0.50     Years: 25.00     Types: Cigarettes   • Smokeless tobacco: Former User     Quit date: 11/23/2016      Comment: e-cigarettes   • Alcohol use 0.0 oz/week      Comment: once a year   • Drug use: No   • Sexual activity: Yes     Partners: Male     Other Topics Concern   • Not on file     Social History Narrative   • No narrative on file       Family History   Problem Relation Age of Onset   • Heart Disease Mother    • Hypertension Mother    • Other Mother      Kidney Disease   • Diabetes Mother    • Diabetes Brother    • Other Maternal Grandmother      [place]'s Chorea       Physical exam:  Blood pressure 120/80, height 1.575 m (5' 2\"), weight 85.7 kg (189 lb), last menstrual period 03/28/2018, not currently breastfeeding.    General:appears stated age, is in no apparent distress, is well developed and well nourished  Head: normocephalic, non-tender  Lungs: Normal respiratory effort. Clear to auscultation bilaterally  Abdomen: Bowel sounds positive, nondistended, soft, " nontender x4, no rebound or guarding. No organomegaly. No masses.  Female GYN: see colpo sheet  Skin: No rashes, or ulcers or lesions seen  Psychiatric: Patient shows appropriate affect, is alert and oriented x3, intact judgment and insight.  1. Cervical dysplasia, moderate  POCT Pregnancy    Consent for Surgery / Procedure    PATHOLOGY SPECIMEN   2. RONALD III with severe dysplasia     3. Procedure discussed with pt. Informed consents signed  4. Negative UPT  5. See colpo sheet. LEEP done. Cervix is excellently hemostatic after the procedure. Pt tolerated the procedure well. No complications encountered  6. Await pathology

## 2018-05-25 ENCOUNTER — TELEPHONE (OUTPATIENT)
Dept: OBGYN | Facility: CLINIC | Age: 43
End: 2018-05-25

## 2018-05-25 NOTE — TELEPHONE ENCOUNTER
----- Message from Shefali Donato M.D. sent at 5/22/2018  9:19 AM PDT -----  Repeat Pap with HPV and ECC 1 year

## 2018-09-11 ENCOUNTER — TELEPHONE (OUTPATIENT)
Dept: MEDICAL GROUP | Age: 43
End: 2018-09-11

## 2018-09-11 DIAGNOSIS — E78.00 PURE HYPERCHOLESTEROLEMIA: ICD-10-CM

## 2018-09-12 NOTE — TELEPHONE ENCOUNTER
1. Caller Name: Jessica O Leon                                           Call Back Number: [phone] (home)         Patient approves a detailed voicemail message: yes    2. SPECIFIC Action To Be Taken: Orders pending, please sign.    3. Diagnosis/Clinical Reason for Request: Preventive lab orders    4. Specialty & Provider Name/Lab/Imaging Location: [facility]    5. Is appointment scheduled for requested order/referral: no    Patient was not informed they will receive a return phone call from the office ONLY if there are any questions before processing their request. Advised to call back if they haven't received a call from the referral department in 5 days.

## 2018-09-13 LAB
ALBUMIN SERPL-MCNC: 4.3 G/DL (ref 3.5–5.5)
ALBUMIN/GLOB SERPL: 1.7 {RATIO} (ref 1.2–2.2)
ALP SERPL-CCNC: 78 IU/L (ref 39–117)
ALT SERPL-CCNC: 13 IU/L (ref 0–32)
AST SERPL-CCNC: 14 IU/L (ref 0–40)
BASOPHILS # BLD AUTO: 0.1 X10E3/UL (ref 0–0.2)
BASOPHILS NFR BLD AUTO: 0 %
BILIRUB SERPL-MCNC: 0.3 MG/DL (ref 0–1.2)
BUN SERPL-MCNC: 9 MG/DL (ref 6–24)
BUN/CREAT SERPL: 13 (ref 9–23)
CALCIUM SERPL-MCNC: 9.5 MG/DL (ref 8.7–10.2)
CHLORIDE SERPL-SCNC: 101 MMOL/L (ref 96–106)
CHOLEST SERPL-MCNC: 205 MG/DL (ref 100–199)
CO2 SERPL-SCNC: 22 MMOL/L (ref 20–29)
CREAT SERPL-MCNC: 0.72 MG/DL (ref 0.57–1)
EOSINOPHIL # BLD AUTO: 0.1 X10E3/UL (ref 0–0.4)
EOSINOPHIL NFR BLD AUTO: 1 %
ERYTHROCYTE [DISTWIDTH] IN BLOOD BY AUTOMATED COUNT: 13.6 % (ref 12.3–15.4)
GLOBULIN SER CALC-MCNC: 2.5 G/DL (ref 1.5–4.5)
GLUCOSE SERPL-MCNC: 82 MG/DL (ref 65–99)
HCT VFR BLD AUTO: 43.5 % (ref 34–46.6)
HDLC SERPL-MCNC: 46 MG/DL
HGB BLD-MCNC: 14.3 G/DL (ref 11.1–15.9)
IF AFRICAN AMERICAN  100797: 119 ML/MIN/1.73
IF NON AFRICAN AMER 100791: 104 ML/MIN/1.73
IMM GRANULOCYTES # BLD: 0.1 X10E3/UL (ref 0–0.1)
IMM GRANULOCYTES NFR BLD: 1 %
IMMATURE CELLS  115398: ABNORMAL
LABORATORY COMMENT REPORT: ABNORMAL
LDLC SERPL CALC-MCNC: 123 MG/DL (ref 0–99)
LYMPHOCYTES # BLD AUTO: 4.7 X10E3/UL (ref 0.7–3.1)
LYMPHOCYTES NFR BLD AUTO: 31 %
MCH RBC QN AUTO: 30.6 PG (ref 26.6–33)
MCHC RBC AUTO-ENTMCNC: 32.9 G/DL (ref 31.5–35.7)
MCV RBC AUTO: 93 FL (ref 79–97)
MONOCYTES # BLD AUTO: 0.8 X10E3/UL (ref 0.1–0.9)
MONOCYTES NFR BLD AUTO: 5 %
MORPHOLOGY BLD-IMP: ABNORMAL
NEUTROPHILS # BLD AUTO: 9.2 X10E3/UL (ref 1.4–7)
NEUTROPHILS NFR BLD AUTO: 62 %
NRBC BLD AUTO-RTO: ABNORMAL %
PLATELET # BLD AUTO: 365 X10E3/UL (ref 150–379)
POTASSIUM SERPL-SCNC: 4 MMOL/L (ref 3.5–5.2)
PROT SERPL-MCNC: 6.8 G/DL (ref 6–8.5)
RBC # BLD AUTO: 4.67 X10E6/UL (ref 3.77–5.28)
SODIUM SERPL-SCNC: 139 MMOL/L (ref 134–144)
TRIGL SERPL-MCNC: 182 MG/DL (ref 0–149)
VLDLC SERPL CALC-MCNC: 36 MG/DL (ref 5–40)
WBC # BLD AUTO: 15 X10E3/UL (ref 3.4–10.8)

## 2018-09-20 ENCOUNTER — OFFICE VISIT (OUTPATIENT)
Dept: MEDICAL GROUP | Age: 43
End: 2018-09-20
Payer: COMMERCIAL

## 2018-09-20 VITALS
WEIGHT: 183 LBS | BODY MASS INDEX: 33.68 KG/M2 | DIASTOLIC BLOOD PRESSURE: 76 MMHG | HEART RATE: 72 BPM | SYSTOLIC BLOOD PRESSURE: 134 MMHG | TEMPERATURE: 98.3 F | HEIGHT: 62 IN | OXYGEN SATURATION: 94 % | RESPIRATION RATE: 12 BRPM

## 2018-09-20 DIAGNOSIS — J45.20 MILD INTERMITTENT ASTHMA WITHOUT COMPLICATION: ICD-10-CM

## 2018-09-20 DIAGNOSIS — E78.00 PURE HYPERCHOLESTEROLEMIA: ICD-10-CM

## 2018-09-20 DIAGNOSIS — Z00.00 PREVENTATIVE HEALTH CARE: ICD-10-CM

## 2018-09-20 DIAGNOSIS — Z23 NEED FOR VACCINATION: ICD-10-CM

## 2018-09-20 DIAGNOSIS — E66.9 OBESITY (BMI 30-39.9): ICD-10-CM

## 2018-09-20 DIAGNOSIS — M25.561 ACUTE PAIN OF RIGHT KNEE: ICD-10-CM

## 2018-09-20 DIAGNOSIS — R01.1 SYSTOLIC MURMUR: ICD-10-CM

## 2018-09-20 PROCEDURE — 99396 PREV VISIT EST AGE 40-64: CPT | Mod: 25 | Performed by: FAMILY MEDICINE

## 2018-09-20 PROCEDURE — 90686 IIV4 VACC NO PRSV 0.5 ML IM: CPT | Performed by: FAMILY MEDICINE

## 2018-09-20 PROCEDURE — 90471 IMMUNIZATION ADMIN: CPT | Performed by: FAMILY MEDICINE

## 2018-09-20 ASSESSMENT — PATIENT HEALTH QUESTIONNAIRE - PHQ9: CLINICAL INTERPRETATION OF PHQ2 SCORE: 0

## 2018-09-20 NOTE — ASSESSMENT & PLAN NOTE
NEW PROBLEM    Patient denies any shortness of breath no dyspnea on exertion no chest pain.  She states her brother was affected by a patent h[place]ins [place] which had to be surgically corrected.  Denies any fevers no chills no nausea no vomiting no generalized malaise.

## 2018-09-20 NOTE — ASSESSMENT & PLAN NOTE
Patient states she lost weight through working.  She states her son has spread from high school who had moved out of the house so she went back to work.  Has resolved she is more active.

## 2018-09-20 NOTE — ASSESSMENT & PLAN NOTE
Has not had to use inhaler   She cut down on smoking  She denies any cough no shortness of breath no dyspnea on exertion  Denies any chest pain no difficulty breathing.

## 2018-09-20 NOTE — PROGRESS NOTES
This medical record contains text that has been entered with the assistance of computer voice recognition and dictation software.  Therefore, it may contain unintended errors in text, spelling, punctuation, or grammar    Chief Complaint   Patient presents with   • Annual Exam         Jessica Ojeda is a 43 y.o. female here evaluation and management of: AWV      HPI:     Mild intermittent asthma without complication  Has not had to use inhaler   She cut down on smoking  She denies any cough no shortness of breath no dyspnea on exertion  Denies any chest pain no difficulty breathing.    Right knee pain  She believes that the pain has improved with weight loss  She continues to use resistent band    Systolic murmur  NEW PROBLEM    Patient denies any shortness of breath no dyspnea on exertion no chest pain.  She states her brother was affected by a patent h[place] which had to be surgically corrected.  Denies any fevers no chills no nausea no vomiting no generalized malaise.    Obesity (BMI 30-39.9)  Patient states she lost weight through working.  She states her son has spread from high school who had moved out of the house so she went back to work.  Has resolved she is more active.    Preventative health care  Patient is a 43-year-old female who presents to clinic for annual wellness visit.  She has a significant past medical history of chronic right knee pain obesity and mild intermittent asthma.   The patient denied any chest pain, no sob, no talbot, no  pnd, no orthopnea, no headache, no changes in vision, no numbness or tingling, no nausea, no diarrhea, no abdominal pain, no fevers, no chills, no bright red blood per rectum, no  difficulty urinating, no burning during micturition, no depressed mood, no other concerns.      Current medicines (including changes today)  Current Outpatient Prescriptions   Medication Sig Dispense Refill   • triamcinolone acetonide (KENALOG) 0.1 % Ointment Aaa bid prn 1 Tube 1   •  "albuterol (VENTOLIN OR PROVENTIL) 108 (90 BASE) MCG/ACT Aero Soln inhalation aerosol Inhale 2 Puffs by mouth every 6 hours as needed for Shortness of Breath. 8.5 g 0   • Loratadine (CLARITIN PO) Take  by mouth.       • albuterol (PROVENTIL) 2.5mg/3ml NEBU 3 mL by Nebulization route every 6 hours as needed for Shortness of Breath. 25 Bullet 0     No current facility-administered medications for this visit.      She  has a past medical history of ASTHMA; Bronchitis; Hay fever; and Pneumonia.  She  has a past surgical history that includes pr enlarge breast with implant.  Social History   Substance Use Topics   • Smoking status: Current Every Day Smoker     Packs/day: 0.25     Years: 25.00     Types: Cigarettes   • Smokeless tobacco: Former User     Quit date: 2016   • Alcohol use 0.0 oz/week      Comment: once a year     Social History     Social History Narrative   • No narrative on file     Family History   Problem Relation Age of Onset   • Heart Disease Mother    • Hypertension Mother    • Other Mother         Kidney Disease   • Diabetes Mother    • Diabetes Brother    • Other Maternal Grandmother         Vesta's Chorea     Family Status   Relation Status   • Bro Alive   • Bro Alive   • Fa Alive   • Mo Alive   • Bro Alive   • MGMo    • MGFa    • PGMo    • PGFa          ROS    Please see hpi     All other systems reviewed and are negative     Objective:     Blood pressure 134/76, pulse 72, temperature 36.8 °C (98.3 °F), temperature source Temporal, resp. rate 12, height 1.575 m (5' 2\"), weight 83 kg (183 lb), SpO2 94 %, not currently breastfeeding. Body mass index is 33.47 kg/m².  Physical Exam:    Constitutional: Alert, no distress.  Skin: Warm, dry, good turgor, no rashes in visible areas.  Eye: Equal, round and reactive, conjunctiva clear, lids normal.  ENMT: Lips without lesions, good dentition, oropharynx clear.  Neck: Trachea midline, no masses, no thyromegaly. No " cervical or supraclavicular lymphadenopathy.  Respiratory: Unlabored respiratory effort, lungs clear to auscultation, no wheezes, no ronchi.  Cardiovascular: 2/6 FARZANA, loudest at 2nd ICS right of sternum  radiates to bilateral carotids  Abdomen: Soft, non-tender, no masses, no hepatosplenomegaly.  Psych: Alert and oriented x3, normal affect and mood.          Assessment and Plan:   The following treatment plan was discussed      1. Mild intermittent asthma without complication  We will proceed with prior authorization for Advair  Which has completely resolved her symptoms she has not had to use rescue inhaler since    2. Acute pain of right knee  Patient has been stable with current management  We will make no changes for now      3. Pure hypercholesterolemia  Patient has been stable with current management  We will make no changes for now      4. Systolic murmur    This is a new issue  I would like to obtain a transthoracic echo to evaluate the aortic valve      - EC-ECHOCARDIOGRAM COMPLETE W/ CONT; Future    5. Obesity (BMI 30-39.9)    - Patient identified as having weight management issue.  Appropriate orders and counseling given.    7. Preventative health care  Up-to-date on all health maintenance topics      Instructed to Follow up in clinic or ER for worsening symptoms, difficulty breathing, lack of expected recovery, or should new symptoms or problems arise.    Followup: Return in about 6 months (around 3/20/2019) for Reevaluation, labs.       Once again this medical record contains text that has been entered with the assistance of computer voice recognition and dictation software.  Therefore, it may contain unintended errors in text, spelling, punctuation, or grammar

## 2018-09-20 NOTE — ASSESSMENT & PLAN NOTE
Patient is a 43-year-old female who presents to clinic for annual wellness visit.  She has a significant past medical history of chronic right knee pain obesity and mild intermittent asthma.   The patient denied any chest pain, no sob, no talbot, no  pnd, no orthopnea, no headache, no changes in vision, no numbness or tingling, no nausea, no diarrhea, no abdominal pain, no fevers, no chills, no bright red blood per rectum, no  difficulty urinating, no burning during micturition, no depressed mood, no other concerns.

## 2019-07-25 ENCOUNTER — OFFICE VISIT (OUTPATIENT)
Dept: URGENT CARE | Facility: PHYSICIAN GROUP | Age: 44
End: 2019-07-25
Payer: COMMERCIAL

## 2019-07-25 VITALS
HEART RATE: 83 BPM | TEMPERATURE: 98.5 F | BODY MASS INDEX: 31.47 KG/M2 | RESPIRATION RATE: 16 BRPM | DIASTOLIC BLOOD PRESSURE: 78 MMHG | WEIGHT: 171 LBS | OXYGEN SATURATION: 96 % | SYSTOLIC BLOOD PRESSURE: 130 MMHG | HEIGHT: 62 IN

## 2019-07-25 DIAGNOSIS — J06.9 UPPER RESPIRATORY TRACT INFECTION, UNSPECIFIED TYPE: ICD-10-CM

## 2019-07-25 DIAGNOSIS — J40 BRONCHITIS: ICD-10-CM

## 2019-07-25 PROCEDURE — 99214 OFFICE O/P EST MOD 30 MIN: CPT | Performed by: NURSE PRACTITIONER

## 2019-07-25 RX ORDER — BENZONATATE 100 MG/1
100 CAPSULE ORAL 3 TIMES DAILY PRN
Qty: 60 CAP | Refills: 0 | Status: SHIPPED | OUTPATIENT
Start: 2019-07-25 | End: 2021-09-23

## 2019-07-25 RX ORDER — METHYLPREDNISOLONE 4 MG/1
4 TABLET ORAL DAILY
Qty: 1 KIT | Refills: 0 | Status: SHIPPED | OUTPATIENT
Start: 2019-07-25 | End: 2021-09-23

## 2019-07-25 RX ORDER — AZITHROMYCIN 500 MG/1
500 TABLET, FILM COATED ORAL DAILY
Qty: 6 TAB | Refills: 0 | Status: SHIPPED | OUTPATIENT
Start: 2019-07-25 | End: 2019-07-30

## 2019-07-25 RX ORDER — ALBUTEROL SULFATE 90 UG/1
2 AEROSOL, METERED RESPIRATORY (INHALATION) EVERY 6 HOURS PRN
Qty: 8.5 G | Refills: 0 | Status: SHIPPED | OUTPATIENT
Start: 2019-07-25 | End: 2021-09-23

## 2019-07-25 ASSESSMENT — ENCOUNTER SYMPTOMS
COUGH: 1
SHORTNESS OF BREATH: 0
WHEEZING: 1
FEVER: 0
SORE THROAT: 1

## 2019-07-25 NOTE — PROGRESS NOTES
Subjective:     Jessica Ojeda is a 44 y.o. female who presents for Cough (congestion, ear pain, sore throat x 2 days)      Tuesday night with sore throat. Greenish-yellow sputum. Left ear pain greater, deep burning itch in ear canal. Pain 6/10.     Not currently taking allergy medication. History of bronchitis, turning in to pneumonia. Steroids have helped in the past with similar symptoms, in addition to antibiotics and inhaler. No chest pain, feels tight. Back pain from cough.       Cough   This is a new problem. The current episode started yesterday. The cough is productive of sputum. Associated symptoms include ear pain, a sore throat and wheezing. Pertinent negatives include no eye redness, fever, nasal congestion, rash or shortness of breath. Associated symptoms comments: Wheezing with cough.. Treatments tried: Delsum-12, Mucinex. The treatment provided no relief. Her past medical history is significant for asthma, bronchitis, environmental allergies and pneumonia.       Past Medical History:   Diagnosis Date   • ASTHMA    • Bronchitis    • Hay fever    • Pneumonia     as child       Past Surgical History:   Procedure Laterality Date   • PB ENLARGE BREAST WITH IMPLANT         Social History     Social History   • Marital status: Single     Spouse name: N/A   • Number of children: N/A   • Years of education: N/A     Occupational History   • Not on file.     Social History Main Topics   • Smoking status: Current Every Day Smoker     Packs/day: 0.25     Years: 25.00     Types: Cigarettes   • Smokeless tobacco: Former User     Quit date: 11/23/2016   • Alcohol use 0.0 oz/week      Comment: once a year   • Drug use: No   • Sexual activity: Yes     Partners: Male     Other Topics Concern   • Not on file     Social History Narrative   • No narrative on file        Family History   Problem Relation Age of Onset   • Heart Disease Mother    • Hypertension Mother    • Other Mother         Kidney Disease   • Diabetes  "Mother    • Diabetes Brother    • Other Maternal Grandmother         Daniels's Chorea        Allergies   Allergen Reactions   • Pcn [Penicillins]        Review of Systems   Constitutional: Negative for fever.   HENT: Positive for congestion, ear pain and sore throat. Negative for ear discharge and sinus pain.    Eyes: Negative for pain, discharge and redness.   Respiratory: Positive for cough and wheezing. Negative for shortness of breath and stridor.    Skin: Negative for rash.   Neurological: Negative for weakness.   Endo/Heme/Allergies: Positive for environmental allergies.   All other systems reviewed and are negative.       Objective:   /78 (BP Location: Left arm, Patient Position: Sitting, BP Cuff Size: Adult)   Pulse 83   Temp 36.9 °C (98.5 °F) (Temporal)   Resp 16   Ht 1.575 m (5' 2\")   Wt 77.6 kg (171 lb)   LMP 07/18/2019   SpO2 96%   BMI 31.28 kg/m²     Physical Exam   Constitutional: She is oriented to person, place, and time. She appears well-developed. No distress.   HENT:   Right Ear: External ear and ear canal normal. No mastoid tenderness. Tympanic membrane is not injected, not erythematous and not bulging. A middle ear effusion is present.   Left Ear: External ear and ear canal normal. No mastoid tenderness. Tympanic membrane is not injected, not erythematous and not bulging. A middle ear effusion is present.   Nose: Mucosal edema present.   Mouth/Throat: Posterior oropharyngeal erythema present. No oropharyngeal exudate or posterior oropharyngeal edema. No tonsillar exudate.   Eyes: Pupils are equal, round, and reactive to light. Conjunctivae are normal.   Neck: Normal range of motion. Neck supple.   Cardiovascular: Normal rate.    Pulmonary/Chest: No accessory muscle usage. No tachypnea. No respiratory distress. She has no decreased breath sounds. She has wheezes in the right lower field and the left lower field. She has no rhonchi. She has no rales.   Cough noted. "   Musculoskeletal: Normal range of motion.   Neurological: She is alert and oriented to person, place, and time.   Skin: Skin is warm and dry.   Psychiatric: She has a normal mood and affect. Her behavior is normal. Judgment and thought content normal.   Vitals reviewed.      Assessment/Plan:   1. Upper respiratory tract infection, unspecified type  - benzonatate (TESSALON) 100 MG Cap; Take 1 Cap by mouth 3 times a day as needed for Cough.  Dispense: 60 Cap; Refill: 0    2. Bronchitis  - albuterol 108 (90 Base) MCG/ACT Aero Soln inhalation aerosol; Inhale 2 Puffs by mouth every 6 hours as needed for Shortness of Breath.  Dispense: 8.5 g; Refill: 0  - azithromycin (ZITHROMAX) 500 MG tablet; Take 1 Tab by mouth every day for 5 days.  Dispense: 6 Tab; Refill: 0  - benzonatate (TESSALON) 100 MG Cap; Take 1 Cap by mouth 3 times a day as needed for Cough.  Dispense: 60 Cap; Refill: 0  Contingent- methylPREDNISolone (MEDROL DOSEPAK) 4 MG Tablet Therapy Pack; Take 1 Tab by mouth every day.  Dispense: 1 Kit; Refill: 0  -Oral hydration  -OTC Mucinex    Follow up with PCP for recurrent asthma, cough and bronchitis.    Follow up for fevers, shortness of breath, increased wheezing, persistent cough, or any other concerns.    Differential diagnosis, natural history, supportive care, and indications for immediate follow-up discussed.

## 2019-07-25 NOTE — PATIENT INSTRUCTIONS
"Upper Respiratory Infection, Adult  Most upper respiratory infections (URIs) are a viral infection of the air passages leading to the lungs. A URI affects the nose, throat, and upper air passages. The most common type of URI is nasopharyngitis and is typically referred to as \"the common cold.\"  URIs run their course and usually go away on their own. Most of the time, a URI does not require medical attention, but sometimes a bacterial infection in the upper airways can follow a viral infection. This is called a secondary infection. Sinus and middle ear infections are common types of secondary upper respiratory infections.  Bacterial pneumonia can also complicate a URI. A URI can worsen asthma and chronic obstructive pulmonary disease (COPD). Sometimes, these complications can require emergency medical care and may be life threatening.  What are the causes?  Almost all URIs are caused by viruses. A virus is a type of germ and can spread from one person to another.  What increases the risk?  You may be at risk for a URI if:  · You smoke.  · You have chronic heart or lung disease.  · You have a weakened defense (immune) system.  · You are very young or very old.  · You have nasal allergies or asthma.  · You work in crowded or poorly ventilated areas.  · You work in health care facilities or schools.  What are the signs or symptoms?  Symptoms typically develop 2-3 days after you come in contact with a cold virus. Most viral URIs last 7-10 days. However, viral URIs from the influenza virus (flu virus) can last 14-18 days and are typically more severe. Symptoms may include:  · Runny or stuffy (congested) nose.  · Sneezing.  · Cough.  · Sore throat.  · Headache.  · Fatigue.  · Fever.  · Loss of appetite.  · Pain in your forehead, behind your eyes, and over your cheekbones (sinus pain).  · Muscle aches.  How is this diagnosed?  Your health care provider may diagnose a URI by:  · Physical exam.  · Tests to check that your " symptoms are not due to another condition such as:  ¨ Strep throat.  ¨ Sinusitis.  ¨ Pneumonia.  ¨ Asthma.  How is this treated?  A URI goes away on its own with time. It cannot be cured with medicines, but medicines may be prescribed or recommended to relieve symptoms. Medicines may help:  · Reduce your fever.  · Reduce your cough.  · Relieve nasal congestion.  Follow these instructions at home:  · Take medicines only as directed by your health care provider.  · Gargle warm saltwater or take cough drops to comfort your throat as directed by your health care provider.  · Use a warm mist humidifier or inhale steam from a shower to increase air moisture. This may make it easier to breathe.  · Drink enough fluid to keep your urine clear or pale yellow.  · Eat soups and other clear broths and maintain good nutrition.  · Rest as needed.  · Return to work when your temperature has returned to normal or as your health care provider advises. You may need to stay home longer to avoid infecting others. You can also use a face mask and careful hand washing to prevent spread of the virus.  · Increase the usage of your inhaler if you have asthma.  · Do not use any tobacco products, including cigarettes, chewing tobacco, or electronic cigarettes. If you need help quitting, ask your health care provider.  How is this prevented?  The best way to protect yourself from getting a cold is to practice good hygiene.  · Avoid oral or hand contact with people with cold symptoms.  · Wash your hands often if contact occurs.  There is no clear evidence that vitamin C, vitamin E, echinacea, or exercise reduces the chance of developing a cold. However, it is always recommended to get plenty of rest, exercise, and practice good nutrition.  Contact a health care provider if:  · You are getting worse rather than better.  · Your symptoms are not controlled by medicine.  · You have chills.  · You have worsening shortness of breath.  · You have brown  or red mucus.  · You have yellow or brown nasal discharge.  · You have pain in your face, especially when you bend forward.  · You have a fever.  · You have swollen neck glands.  · You have pain while swallowing.  · You have white areas in the back of your throat.  Get help right away if:  · You have severe or persistent:  ¨ Headache.  ¨ Ear pain.  ¨ Sinus pain.  ¨ Chest pain.  · You have chronic lung disease and any of the following:  ¨ Wheezing.  ¨ Prolonged cough.  ¨ Coughing up blood.  ¨ A change in your usual mucus.  · You have a stiff neck.  · You have changes in your:  ¨ Vision.  ¨ Hearing.  ¨ Thinking.  ¨ Mood.  This information is not intended to replace advice given to you by your health care provider. Make sure you discuss any questions you have with your health care provider.  Document Released: 06/13/2002 Document Revised: 08/20/2017 Document Reviewed: 03/25/2015  [employer] Interactive Patient Education © 2017 [employer]    Bronchospasm, Adult  A bronchospasm is when the tubes that carry air in and out of your lungs (airways) spasm or tighten. During a bronchospasm it is hard to breathe. This is because the airways get smaller. A bronchospasm can be triggered by:  · Allergies. These may be to animals, pollen, food, or mold.  · Infection. This is a common cause of bronchospasm.  · Exercise.  · Irritants. These include pollution, cigarette smoke, strong odors, aerosol sprays, and paint fumes.  · Weather changes.  · Stress.  · Being emotional.  Follow these instructions at home:  · Always have a plan for getting help. Know when to call your doctor and local emergency services (911 in the [place] Know where you can get emergency care.  · Only take medicines as told by your doctor.  · If you were prescribed an inhaler or nebulizer machine, ask your doctor how to use it correctly. Always use a spacer with your inhaler if you were given one.  · Stay calm during an attack. Try to relax and breathe more  slowly.  · Control your home environment:  ¨ Change your heating and air conditioning filter at least once a month.  ¨ Limit your use of fireplaces and wood stoves.  ¨ Do not  smoke. Do not  allow smoking in your home.  ¨ Avoid perfumes and fragrances.  ¨ Get rid of pests (such as roaches and mice) and their droppings.  ¨ Throw away plants if you see mold on them.  ¨ Keep your house clean and dust free.  ¨ Replace carpet with wood, tile, or vinyl emily. Carpet can trap dander and dust.  ¨ Use allergy-proof pillows, mattress covers, and box spring covers.  ¨ Wash bed sheets and blankets every week in hot water. Dry them in a dryer.  ¨ Use blankets that are made of polyester or cotton.  ¨ Wash hands frequently.  Contact a doctor if:  · You have muscle aches.  · You have chest pain.  · The thick spit you spit or cough up (sputum) changes from clear or white to yellow, green, gray, or bloody.  · The thick spit you spit or cough up gets thicker.  · There are problems that may be related to the medicine you are given such as:  ¨ A rash.  ¨ Itching.  ¨ Swelling.  ¨ Trouble breathing.  Get help right away if:  · You feel you cannot breathe or catch your breath.  · You cannot stop coughing.  · Your treatment is not helping you breathe better.  · You have very bad chest pain.  This information is not intended to replace advice given to you by your health care provider. Make sure you discuss any questions you have with your health care provider.  Document Released: 10/15/2010 Document Revised: 05/25/2017 Document Reviewed: 06/10/2014  [employer] Interactive Patient Education © 2017 [employer]

## 2019-07-27 ASSESSMENT — ENCOUNTER SYMPTOMS
SINUS PAIN: 0
EYE PAIN: 0
EYE REDNESS: 0
WEAKNESS: 0
STRIDOR: 0
EYE DISCHARGE: 0

## 2021-09-23 ENCOUNTER — OFFICE VISIT (OUTPATIENT)
Dept: MEDICAL GROUP | Age: 46
End: 2021-09-23
Payer: COMMERCIAL

## 2021-09-23 VITALS
HEIGHT: 62 IN | TEMPERATURE: 96.9 F | SYSTOLIC BLOOD PRESSURE: 138 MMHG | WEIGHT: 194.4 LBS | HEART RATE: 63 BPM | DIASTOLIC BLOOD PRESSURE: 80 MMHG | BODY MASS INDEX: 35.77 KG/M2 | OXYGEN SATURATION: 94 % | RESPIRATION RATE: 16 BRPM

## 2021-09-23 DIAGNOSIS — I10 ESSENTIAL HYPERTENSION: ICD-10-CM

## 2021-09-23 DIAGNOSIS — Z12.11 SCREENING FOR COLON CANCER: ICD-10-CM

## 2021-09-23 DIAGNOSIS — E66.01 CLASS 2 SEVERE OBESITY DUE TO EXCESS CALORIES WITH SERIOUS COMORBIDITY IN ADULT, UNSPECIFIED BMI (HCC): ICD-10-CM

## 2021-09-23 DIAGNOSIS — Z00.00 ANNUAL PHYSICAL EXAM: ICD-10-CM

## 2021-09-23 DIAGNOSIS — Z12.31 VISIT FOR SCREENING MAMMOGRAM: ICD-10-CM

## 2021-09-23 PROBLEM — E66.09 OBESITY DUE TO EXCESS CALORIES WITH SERIOUS COMORBIDITY: Status: ACTIVE | Noted: 2021-09-23

## 2021-09-23 PROCEDURE — 99396 PREV VISIT EST AGE 40-64: CPT | Performed by: FAMILY MEDICINE

## 2021-09-23 PROCEDURE — 99214 OFFICE O/P EST MOD 30 MIN: CPT | Mod: 25 | Performed by: FAMILY MEDICINE

## 2021-09-23 RX ORDER — CLONIDINE HYDROCHLORIDE 0.1 MG/1
0.2 TABLET ORAL ONCE
Status: COMPLETED | OUTPATIENT
Start: 2021-09-23 | End: 2021-09-23

## 2021-09-23 RX ORDER — LISINOPRIL AND HYDROCHLOROTHIAZIDE 25; 20 MG/1; MG/1
1 TABLET ORAL DAILY
Qty: 90 TABLET | Refills: 0 | Status: SHIPPED | OUTPATIENT
Start: 2021-09-23 | End: 2022-01-09

## 2021-09-23 RX ADMIN — CLONIDINE HYDROCHLORIDE 0.2 MG: 0.1 TABLET ORAL at 08:14

## 2021-09-23 ASSESSMENT — PATIENT HEALTH QUESTIONNAIRE - PHQ9: CLINICAL INTERPRETATION OF PHQ2 SCORE: 0

## 2021-09-23 NOTE — PROGRESS NOTES
Subjective:     CC:   Chief Complaint   Patient presents with   • Annual Exam       HPI:   Jessica Ojeda is a 46 y.o. female who presents for annual exam    1. Annual physical exam  See below    2. Essential hypertension  UNCONTROLLED CONDITION    The patient does not have a history of hypertension however in the last 2 years she has gained about 21 pounds.  Patient states the COVID-19 pandemic decreased to their follow-up with medical providers as well as getting medication.      Patient has GYN provider: No   Last Pap Smear: overdue   H/O Abnormal Pap: No  Last Mammogram: up to date  Last Bone Density Test: NA  Last Colorectal Cancer Screening: overdue  Last Tdap: up to date  Received HPV series: No    Exercise: no regular exercise, sedentary  Diet: regular      No LMP recorded.  Hx STDs: No  Birth control: none  Menses every month with 5 days with light bleeding.  Denies cramping.  No significant bloating/fluid retention, pelvic pain, or dyspareunia. No abnormal vaginal discharge.  No breast tenderness, mass, nipple discharge, changes in size or contour, or abnormal cyclic discomfort.    OB History    Para Term  AB Living   1 1 1 0 0 1   SAB TAB Ectopic Molar Multiple Live Births   0 0 0 0 0 1      She  reports being sexually active and has had partner(s) who are male.    She  has a past medical history of ASTHMA, Bronchitis, Hay fever, and Pneumonia.  She  has a past surgical history that includes pr breast augmentation with implant.    Family History   Problem Relation Age of Onset   • Heart Disease Mother    • Hypertension Mother    • Other Mother         Kidney Disease   • Diabetes Mother    • Diabetes Brother    • Other Maternal Grandmother         [place]'s Chorea     Social History     Tobacco Use   • Smoking status: Current Every Day Smoker     Packs/day: 0.25     Years: 25.00     Pack years: 6.25     Types: Cigarettes   • Smokeless tobacco: Former User     Quit date: 2016    Vaping Use   • Vaping Use: Never used   Substance Use Topics   • Alcohol use: Yes     Alcohol/week: 0.0 oz     Comment: once a year   • Drug use: No       Patient Active Problem List    Diagnosis Date Noted   • Obesity due to excess calories with serious comorbidity 09/23/2021   • Pure hypercholesterolemia 09/20/2018   • Systolic murmur 09/20/2018   • Preventative health care 09/20/2018   • Sexual pain disorder 10/18/2017   • Dermatitis 10/18/2017   • Mild intermittent asthma without complication 10/03/2017   • Obesity (BMI 30-39.9) 10/03/2017   • LGSIL of cervix of undetermined significance 10/03/2017   • Family history of Pike's chorea 10/03/2017   • Right knee pain 10/03/2017     Current Outpatient Medications   Medication Sig Dispense Refill   • lisinopril-hydrochlorothiazide (PRINZIDE) 20-25 MG per tablet Take 1 Tablet by mouth every day. 90 Tablet 0   • albuterol (VENTOLIN OR PROVENTIL) 108 (90 BASE) MCG/ACT Aero Soln inhalation aerosol Inhale 2 Puffs by mouth every 6 hours as needed for Shortness of Breath. 8.5 g 0   • albuterol (PROVENTIL) 2.5mg/3ml NEBU 3 mL by Nebulization route every 6 hours as needed for Shortness of Breath. 25 Bullet 0     No current facility-administered medications for this visit.     Allergies   Allergen Reactions   • Pcn [Penicillins]        Review of Systems   Constitutional: Negative for fever, chills and malaise/fatigue.   HENT: Negative for congestion.    Eyes: Negative for pain.   Respiratory: Negative for cough and shortness of breath.    Cardiovascular: Negative for chest pain and leg swelling.   Gastrointestinal: Negative for nausea, vomiting, abdominal pain and diarrhea.   Genitourinary: Negative for dysuria and hematuria.   Skin: Negative for rash.   Neurological: Negative for dizziness, focal weakness and headaches.   Endo/Heme/Allergies: Does not bruise/bleed easily.   Psychiatric/Behavioral: Negative for depression.  The patient is not nervous/anxious.   "    Objective:   /80 (BP Location: Right arm, Patient Position: Sitting, BP Cuff Size: Large adult)   Pulse 63   Temp 36.1 °C (96.9 °F) (Temporal)   Resp 16   Ht 1.575 m (5' 2\")   Wt 88.2 kg (194 lb 6.4 oz)   SpO2 94%   BMI 35.56 kg/m²     Wt Readings from Last 4 Encounters:   09/23/21 88.2 kg (194 lb 6.4 oz)   07/25/19 77.6 kg (171 lb)   09/20/18 83 kg (183 lb)   04/26/18 85.7 kg (189 lb)           Physical Exam:  Constitutional: Well-developed and well-nourished. Not diaphoretic. No distress.   Skin: Skin is warm and dry. No rash noted.  Head: Atraumatic without lesions.  Eyes: Conjunctivae and extraocular motions are normal. Pupils are equal, round, and reactive to light. No scleral icterus.   Ears:  External ears unremarkable. Tympanic membranes clear and intact.  Nose: Nares patent. Septum midline. Turbinates without erythema nor edema. No discharge.   Mouth/Throat: Tongue normal. Oropharynx is clear and moist. Posterior pharynx without erythema or exudates.  Neck: Supple, trachea midline. Normal range of motion. No thyromegaly present. No lymphadenopathy--cervical or supraclavicular.  Cardiovascular: Regular rate and rhythm, S1 and S2 without murmur, rubs, or gallops.    Respiratory: Effort normal. Clear to auscultation throughout. No adventitious sounds.     Abdomen: Soft, non tender, and without distention. Active bowel sounds in all four quadrants. No rebound, guarding, masses or HSM.    Extremities: No cyanosis, clubbing, erythema, nor edema. Distal pulses intact and symmetric.   Musculoskeletal: All major joints AROM full in all directions without pain.  Neurological: Alert and oriented x 3. Grossly non-focal. Strength and sensation grossly intact. DTRs 2+/3 and symmetric.   Psychiatric:  Behavior, mood, and affect are appropriate.      After 0.2 mg of clonidine blood pressure decreased to 138/80    Assessment and Plan:     1. Annual physical exam  - Comp Metabolic Panel; Future  - CBC " WITHOUT DIFFERENTIAL; Future  - Lipid Profile; Future  - TSH+FREE T4  - T3 FREE; Future  - VITAMIN D,25 HYDROXY; Future    2. Essential hypertension    Start Prinzide  Return to clinic with a 2-week BP log    - lisinopril-hydrochlorothiazide (PRINZIDE) 20-25 MG per tablet; Take 1 Tablet by mouth every day.  Dispense: 90 Tablet; Refill: 0    - cloNIDine (CATAPRES) tablet 0.2 mg    3. Class 2 severe obesity due to excess calories with serious comorbidity in adult, unspecified BMI (HCC)  - Patient identified as having weight management issue.  Appropriate orders and counseling given.    4. Visit for screening mammogram  - MA-SCREENING MAMMO BILAT W/CAD; Future    5. Screening for colon cancer  - COLOGUARD COLON CANCER SCREENING    Other orders  - lisinopril-hydrochlorothiazide (PRINZIDE) 20-25 MG per tablet; Take 1 Tablet by mouth every day.  Dispense: 90 Tablet; Refill: 0      Health maintenance:     Labs per orders  Immunizations per orders  Patient counseled about skin care, diet, supplements, and exercise.  Discussed  breast self exam     Follow-up: Return in about 6 months (around 3/23/2022) for Reevaluation, labs.

## 2021-09-29 ENCOUNTER — HOSPITAL ENCOUNTER (OUTPATIENT)
Dept: LAB | Facility: MEDICAL CENTER | Age: 46
End: 2021-09-29
Attending: FAMILY MEDICINE
Payer: COMMERCIAL

## 2021-09-29 DIAGNOSIS — Z00.00 ANNUAL PHYSICAL EXAM: ICD-10-CM

## 2021-09-29 LAB
25(OH)D3 SERPL-MCNC: 24 NG/ML (ref 30–100)
ALBUMIN SERPL BCP-MCNC: 4.1 G/DL (ref 3.2–4.9)
ALBUMIN/GLOB SERPL: 1.4 G/DL
ALP SERPL-CCNC: 87 U/L (ref 30–99)
ALT SERPL-CCNC: 15 U/L (ref 2–50)
ANION GAP SERPL CALC-SCNC: 11 MMOL/L (ref 7–16)
AST SERPL-CCNC: 18 U/L (ref 12–45)
BILIRUB SERPL-MCNC: 0.4 MG/DL (ref 0.1–1.5)
BUN SERPL-MCNC: 12 MG/DL (ref 8–22)
CALCIUM SERPL-MCNC: 9.3 MG/DL (ref 8.5–10.5)
CHLORIDE SERPL-SCNC: 104 MMOL/L (ref 96–112)
CHOLEST SERPL-MCNC: 210 MG/DL (ref 100–199)
CO2 SERPL-SCNC: 24 MMOL/L (ref 20–33)
CREAT SERPL-MCNC: 0.76 MG/DL (ref 0.5–1.4)
ERYTHROCYTE [DISTWIDTH] IN BLOOD BY AUTOMATED COUNT: 48.9 FL (ref 35.9–50)
FASTING STATUS PATIENT QL REPORTED: NORMAL
GLOBULIN SER CALC-MCNC: 3 G/DL (ref 1.9–3.5)
GLUCOSE SERPL-MCNC: 102 MG/DL (ref 65–99)
HCT VFR BLD AUTO: 47.8 % (ref 37–47)
HDLC SERPL-MCNC: 41 MG/DL
HGB BLD-MCNC: 15.5 G/DL (ref 12–16)
LDLC SERPL CALC-MCNC: 131 MG/DL
MCH RBC QN AUTO: 30.6 PG (ref 27–33)
MCHC RBC AUTO-ENTMCNC: 32.4 G/DL (ref 33.6–35)
MCV RBC AUTO: 94.3 FL (ref 81.4–97.8)
PLATELET # BLD AUTO: 350 K/UL (ref 164–446)
PMV BLD AUTO: 10.3 FL (ref 9–12.9)
POTASSIUM SERPL-SCNC: 4.2 MMOL/L (ref 3.6–5.5)
PROT SERPL-MCNC: 7.1 G/DL (ref 6–8.2)
RBC # BLD AUTO: 5.07 M/UL (ref 4.2–5.4)
SODIUM SERPL-SCNC: 139 MMOL/L (ref 135–145)
T3FREE SERPL-MCNC: 3.97 PG/ML (ref 2–4.4)
T4 FREE SERPL-MCNC: 1.42 NG/DL (ref 0.93–1.7)
TRIGL SERPL-MCNC: 189 MG/DL (ref 0–149)
TSH SERPL DL<=0.005 MIU/L-ACNC: 2.47 UIU/ML (ref 0.38–5.33)
WBC # BLD AUTO: 13 K/UL (ref 4.8–10.8)

## 2021-09-29 PROCEDURE — 84481 FREE ASSAY (FT-3): CPT

## 2021-09-29 PROCEDURE — 84443 ASSAY THYROID STIM HORMONE: CPT

## 2021-09-29 PROCEDURE — 85027 COMPLETE CBC AUTOMATED: CPT

## 2021-09-29 PROCEDURE — 84439 ASSAY OF FREE THYROXINE: CPT

## 2021-09-29 PROCEDURE — 80061 LIPID PANEL: CPT

## 2021-09-29 PROCEDURE — 80053 COMPREHEN METABOLIC PANEL: CPT

## 2021-09-29 PROCEDURE — 36415 COLL VENOUS BLD VENIPUNCTURE: CPT

## 2021-09-29 PROCEDURE — 82306 VITAMIN D 25 HYDROXY: CPT

## 2021-11-30 ENCOUNTER — HOSPITAL ENCOUNTER (OUTPATIENT)
Facility: MEDICAL CENTER | Age: 46
End: 2021-11-30
Attending: PHYSICIAN ASSISTANT
Payer: COMMERCIAL

## 2021-11-30 ENCOUNTER — OFFICE VISIT (OUTPATIENT)
Dept: URGENT CARE | Facility: PHYSICIAN GROUP | Age: 46
End: 2021-11-30
Payer: COMMERCIAL

## 2021-11-30 VITALS
WEIGHT: 190 LBS | TEMPERATURE: 96.6 F | BODY MASS INDEX: 34.96 KG/M2 | RESPIRATION RATE: 16 BRPM | HEART RATE: 73 BPM | SYSTOLIC BLOOD PRESSURE: 128 MMHG | OXYGEN SATURATION: 98 % | DIASTOLIC BLOOD PRESSURE: 82 MMHG | HEIGHT: 62 IN

## 2021-11-30 DIAGNOSIS — B34.9 VIRAL ILLNESS: ICD-10-CM

## 2021-11-30 DIAGNOSIS — R05.9 COUGH: ICD-10-CM

## 2021-11-30 LAB — COVID ORDER STATUS COVID19: NORMAL

## 2021-11-30 PROCEDURE — U0003 INFECTIOUS AGENT DETECTION BY NUCLEIC ACID (DNA OR RNA); SEVERE ACUTE RESPIRATORY SYNDROME CORONAVIRUS 2 (SARS-COV-2) (CORONAVIRUS DISEASE [COVID-19]), AMPLIFIED PROBE TECHNIQUE, MAKING USE OF HIGH THROUGHPUT TECHNOLOGIES AS DESCRIBED BY CMS-2020-01-R: HCPCS

## 2021-11-30 PROCEDURE — U0005 INFEC AGEN DETEC AMPLI PROBE: HCPCS

## 2021-11-30 PROCEDURE — 99213 OFFICE O/P EST LOW 20 MIN: CPT | Performed by: PHYSICIAN ASSISTANT

## 2021-11-30 ASSESSMENT — ENCOUNTER SYMPTOMS
COUGH: 1
VOMITING: 0
SHORTNESS OF BREATH: 0
MYALGIAS: 0
WHEEZING: 0
PALPITATIONS: 0
ABDOMINAL PAIN: 0
FEVER: 0
DIAPHORESIS: 0
STRIDOR: 0
HEADACHES: 0
DIARRHEA: 1
SINUS PAIN: 0
SPUTUM PRODUCTION: 0
NAUSEA: 1
CHILLS: 0
SORE THROAT: 0
HEARTBURN: 0
DIZZINESS: 0

## 2021-11-30 ASSESSMENT — FIBROSIS 4 INDEX: FIB4 SCORE: 0.6

## 2021-11-30 NOTE — PROGRESS NOTES
Subjective:   Jessica Ojeda is a 46 y.o. female who presents for Cough ([id number] )      HPI:  This is a very pleasant 46-year-old female presented to the clinic for coronavirus screening.  Patient states her symptoms started yesterday.  Symptoms started with nausea, abdominal uneasiness and diarrhea.  The symptoms have improved over the last 12 hours.  Not currently present in clinic.  She also describes a cough that is deep and nonproductive.  Cough seems to be most prevalent when she is lying down.  Denies any associated shortness of breath, chest pain or sputum production.  Does have a history of asthma that is well controlled with a rescue inhaler.  She has not had to increase the frequency of her usage.  Denies any wheezing.  She has not been running a fever.  Denies any body aches or chills.  Currently not taking any OTC meds for her symptoms.  Has been vaccinated for COVID-19.    Review of Systems   Constitutional: Negative for chills, diaphoresis, fever and malaise/fatigue.   HENT: Negative for congestion, ear pain, sinus pain and sore throat.    Respiratory: Positive for cough. Negative for sputum production, shortness of breath, wheezing and stridor.    Cardiovascular: Negative for chest pain and palpitations.   Gastrointestinal: Positive for diarrhea (resolved) and nausea (resolved). Negative for abdominal pain, heartburn and vomiting.   Musculoskeletal: Negative for myalgias.   Neurological: Negative for dizziness and headaches.   Endo/Heme/Allergies: Positive for environmental allergies.       Medications:    • albuterol Aers  • albuterol Nebu  • lisinopril-hydrochlorothiazide    Allergies: Pcn [penicillins]    Problem List: Jessica Ojeda does not have any pertinent problems on file.    Surgical History:  Past Surgical History:   Procedure Laterality Date   • [place] BREAST AUGMENTATION WITH IMPLANT         Past Social Hx: Jessica Ojeda  reports that she has been smoking cigarettes. She has a 6.25  "pack-year smoking history. She quit smokeless tobacco use about 5 years ago. She reports current alcohol use. She reports that she does not use drugs.     Past Family Hx:  Jessica Ojeda family history includes Diabetes in her brother and mother; Heart Disease in her mother; Hypertension in her mother; Other in her maternal grandmother and mother.     Problem list, medications, and allergies reviewed by myself today in Epic.     Objective:     /82   Pulse 73   Temp 35.9 °C (96.6 °F) (Temporal)   Resp 16   Ht 1.575 m (5' 2\")   Wt 86.2 kg (190 lb)   SpO2 98%   BMI 34.75 kg/m²     Physical Exam  Constitutional:       General: She is not in acute distress.     Appearance: Normal appearance. She is not ill-appearing, toxic-appearing or diaphoretic.   HENT:      Head: Normocephalic and atraumatic.      Right Ear: Tympanic membrane, ear canal and external ear normal.      Left Ear: Tympanic membrane, ear canal and external ear normal.      Nose: Nose normal. No congestion or rhinorrhea.      Mouth/Throat:      Mouth: Mucous membranes are moist.      Pharynx: No oropharyngeal exudate or posterior oropharyngeal erythema.   Eyes:      Conjunctiva/sclera: Conjunctivae normal.   Cardiovascular:      Rate and Rhythm: Normal rate and regular rhythm.      Pulses: Normal pulses.      Heart sounds: Normal heart sounds.   Pulmonary:      Effort: Pulmonary effort is normal.      Breath sounds: Normal breath sounds. No wheezing, rhonchi or rales.   Abdominal:      Palpations: Abdomen is soft.      Tenderness: There is no abdominal tenderness. There is no guarding.   Musculoskeletal:      Cervical back: Normal range of motion. No muscular tenderness.   Lymphadenopathy:      Cervical: No cervical adenopathy.   Skin:     General: Skin is warm and dry.      Capillary Refill: Capillary refill takes less than 2 seconds.   Neurological:      Mental Status: She is alert.   Psychiatric:         Mood and Affect: Mood normal.        "  Thought Content: Thought content normal.           Assessment/Plan:     Comments/MDM:     Patient's vital signs are reassuring and they appear hemodynamically stable and do not require higher level care at this time  I discussed self isolation and provided printed instructions. Stay isolated until results are made available. May take 2-3 days.  Recommended supportive treatment including increased fluids and rest.  Use Tylenol and ibuprofen as needed for pain and fever.   I educated the patient on possibility of a false-negative test and indications for repeat testing.  No evidence of bacterial infection present at this time.  Patient's lung sounds are clear to auscultation.  SPO2 98% on room air.  No concern at this time for acute asthma exacerbation, bronchitis or pneumonia.  I instructed the patient to try to follow up with their PCP (if applicable) for follow up care  I provided the patient the printed AVS which contains information about signing up for [employer]   I will contact the patient via [employer] with Covid results.  Red flag symptoms were discussed with the patient at length today.  If any of these symptoms present return to the clinic or nearest emergency department.    Please call with any questions or concerns.     Diagnosis and associated orders:     1. Cough  COVID/SARS CoV-2 PCR   2. Viral illness  COVID/SARS CoV-2 PCR            Differential diagnosis, natural history, supportive care, and indications for immediate follow-up discussed.    Advised the patient to follow-up with the primary care physician for recheck, reevaluation, and consideration of further management.    Please note that this dictation was created using voice recognition software. I have made reasonable attempt to correct obvious errors, but I expect that there are errors of grammar and possibly content that I did not discover before finalizing the note.    This note was electronically signed by MARLEEN Chua PA-C

## 2021-12-01 LAB
SARS-COV-2 RNA RESP QL NAA+PROBE: NOTDETECTED
SPECIMEN SOURCE: NORMAL

## 2022-01-09 RX ORDER — LISINOPRIL AND HYDROCHLOROTHIAZIDE 25; 20 MG/1; MG/1
TABLET ORAL
Qty: 90 TABLET | Refills: 0 | Status: SHIPPED | OUTPATIENT
Start: 2022-01-09 | End: 2022-11-09

## 2022-02-15 ENCOUNTER — OFFICE VISIT (OUTPATIENT)
Dept: MEDICAL GROUP | Age: 47
End: 2022-02-15
Payer: COMMERCIAL

## 2022-02-15 VITALS
SYSTOLIC BLOOD PRESSURE: 112 MMHG | TEMPERATURE: 97.8 F | DIASTOLIC BLOOD PRESSURE: 78 MMHG | HEART RATE: 75 BPM | OXYGEN SATURATION: 96 % | RESPIRATION RATE: 18 BRPM | WEIGHT: 206.6 LBS | BODY MASS INDEX: 38.02 KG/M2 | HEIGHT: 62 IN

## 2022-02-15 DIAGNOSIS — R06.83 SNORING: ICD-10-CM

## 2022-02-15 DIAGNOSIS — J06.9 UPPER RESPIRATORY TRACT INFECTION, UNSPECIFIED TYPE: ICD-10-CM

## 2022-02-15 DIAGNOSIS — E66.9 OBESITY (BMI 30-39.9): ICD-10-CM

## 2022-02-15 PROCEDURE — 99214 OFFICE O/P EST MOD 30 MIN: CPT | Performed by: FAMILY MEDICINE

## 2022-02-15 RX ORDER — AZITHROMYCIN 250 MG/1
TABLET, FILM COATED ORAL
Qty: 6 TABLET | Refills: 0 | Status: SHIPPED | OUTPATIENT
Start: 2022-02-15 | End: 2022-11-09

## 2022-02-15 RX ORDER — METHYLPREDNISOLONE SODIUM SUCCINATE 40 MG/ML
40 INJECTION, POWDER, LYOPHILIZED, FOR SOLUTION INTRAMUSCULAR; INTRAVENOUS ONCE
Status: COMPLETED | OUTPATIENT
Start: 2022-02-15 | End: 2022-02-15

## 2022-02-15 RX ORDER — PREDNISONE 20 MG/1
TABLET ORAL
Qty: 12 TABLET | Refills: 0 | Status: SHIPPED | OUTPATIENT
Start: 2022-02-15 | End: 2022-11-09

## 2022-02-15 RX ORDER — CODEINE PHOSPHATE AND GUAIFENESIN 10; 100 MG/5ML; MG/5ML
5 SOLUTION ORAL 3 TIMES DAILY PRN
Qty: 105 ML | Refills: 0 | Status: SHIPPED | OUTPATIENT
Start: 2022-02-15 | End: 2022-02-22

## 2022-02-15 RX ADMIN — METHYLPREDNISOLONE SODIUM SUCCINATE 40 MG: 40 INJECTION, POWDER, LYOPHILIZED, FOR SOLUTION INTRAMUSCULAR; INTRAVENOUS at 10:06

## 2022-02-15 ASSESSMENT — PATIENT HEALTH QUESTIONNAIRE - PHQ9: CLINICAL INTERPRETATION OF PHQ2 SCORE: 0

## 2022-02-15 ASSESSMENT — FIBROSIS 4 INDEX: FIB4 SCORE: 0.6

## 2022-02-15 NOTE — PROGRESS NOTES
This medical record contains text that has been entered with the assistance of computer voice recognition and dictation software.  Therefore, it may contain unintended errors in text, spelling, punctuation, or grammar      Chief Complaint   Patient presents with   • Cough         Jessica Ojeda is a 46 y.o. female here evaluation and management of: Cough      HPI:     1. Upper respiratory tract infection, unspecified type  Jessica is a very pleasant 46-year-old female who presents to the clinic with a chief complaint of having significant cough for the past 2 weeks.  She states she was seen in the emergency room and ruled in [place] where chest x-ray showed possible COVID-19 pneumonia she has been vaccinated with Jose x2.  She states that she is feeling a little bit better but the cough is preventing her from sleeping at night and she also feels weak.  - methylPREDNISolone Sodium Succ    2. Snoring  3. Obesity (BMI 30-39.9)    The patient states that she has been having a hard time losing weight.  She states she was losing weight before Covid, however the pandemic interfered with  her routine.  She was also told by her  and mother that she snores and often wakes up gasping for air.      Current medicines (including changes today)  Current Outpatient Medications   Medication Sig Dispense Refill   • guaifenesin-codeine (ROBITUSSIN AC) Solution oral solution Take 5 mL by mouth 3 times a day as needed for Cough for up to 7 days. 105 mL 0   • predniSONE (DELTASONE) 20 MG Tab Take 3 tabs po for 2 days then 2 tabs for 2 days then 1 for 2 days 12 Tablet 0   • Ascorbic Acid 500 MG Cap TAKE 2 TABS PO TID 30 Capsule 0   • azithromycin (ZITHROMAX) 250 MG Tab 2 tabs by mouth day 1, 1 tab by mouth days 2-5 6 Tablet 0   • lisinopril-hydrochlorothiazide (PRINZIDE) 20-25 MG per tablet Take 1 tablet by mouth once daily 90 Tablet 0   • albuterol (VENTOLIN OR PROVENTIL) 108 (90 BASE) MCG/ACT Aero Soln inhalation aerosol  "Inhale 2 Puffs by mouth every 6 hours as needed for Shortness of Breath. 8.5 g 0   • albuterol (PROVENTIL) 2.5mg/3ml NEBU 3 mL by Nebulization route every 6 hours as needed for Shortness of Breath. 25 Bullet 0     Current Facility-Administered Medications   Medication Dose Route Frequency Provider Last Rate Last Admin   • methylPREDNISolone (SOLU-MEDROL) 40 MG injection 40 mg  40 mg Intramuscular Once Kb Correa M.D.         She  has a past medical history of ASTHMA, Bronchitis, Hay fever, and Pneumonia.  She  has a past surgical history that includes pr breast augmentation with implant.  Social History     Tobacco Use   • Smoking status: Current Every Day Smoker     Packs/day: 0.25     Years: 25.00     Pack years: 6.25     Types: Cigarettes   • Smokeless tobacco: Former User     Quit date: 2016   Vaping Use   • Vaping Use: Never used   Substance Use Topics   • Alcohol use: Yes     Alcohol/week: 0.0 oz     Comment: once a year   • Drug use: No     Social History     Social History Narrative   • Not on file     Family History   Problem Relation Age of Onset   • Heart Disease Mother    • Hypertension Mother    • Other Mother         Kidney Disease   • Diabetes Mother    • Diabetes Brother    • Other Maternal Grandmother         [place]'s Chorea     Family Status   Relation Name Status   • Bro  Alive   • Bro  Alive   • Fa  Alive   • Mo  Alive   • Bro  Alive   • MGMo     • MGFa     • PGMo     • PGFa           ROS    The pertinent  ROS findings can be seen in the HPI above.     All other systems reviewed and are negative     Objective:     /78 (BP Location: Left arm, Patient Position: Sitting, BP Cuff Size: Large adult)   Pulse 75   Temp 36.6 °C (97.8 °F) (Temporal)   Resp 18   Ht 1.575 m (5' 2\")   Wt 93.7 kg (206 lb 9.6 oz)   SpO2 96%  Body mass index is 37.79 kg/m².      Physical Exam:    Constitutional: Alert, no distress.  Skin: No suspicious lesions  Eye: " Equal, round and reactive, conjunctiva clear, lids normal.  ENMT: Lips without lesions, good dentition, oropharynx clear.  Neck: Trachea midline, no masses, no thyromegaly. No cervical or supraclavicular lymphadenopathy.  Respiratory: Unlabored respiratory effort, lungs clear to auscultation, no wheezes, no ronchi.  Cardiovascular: Normal S1, S2, no murmur, no edema  Abdomen: Soft, non-tender, no masses, no hepatosplenomegaly.        Assessment and Plan:   The following treatment plan was discussed      1. Upper respiratory tract infection, unspecified type    He is hemodynamically stable, so she may be treated as an outpatient safely    We will proceed with antibiotics because of the prolonged and severe symptoms    - guaifenesin-codeine (ROBITUSSIN AC) Solution oral solution; Take 5 mL by mouth 3 times a day as needed for Cough for up to 7 days.  Dispense: 105 mL; Refill: 0  - predniSONE (DELTASONE) 20 MG Tab; Take 3 tabs po for 2 days then 2 tabs for 2 days then 1 for 2 days  Dispense: 12 Tablet; Refill: 0  - Ascorbic Acid 500 MG Cap; TAKE 2 TABS PO TID  Dispense: 30 Capsule; Refill: 0  - methylPREDNISolone (SOLU-MEDROL) 40 MG injection 40 mg  - azithromycin (ZITHROMAX) 250 MG Tab; 2 tabs by mouth day 1, 1 tab by mouth days 2-5  Dispense: 6 Tablet; Refill: 0  - Referral to Pulmonary and Sleep Medicine    2. Snoring  3. Obesity (BMI 30-39.9)    Clinically suspicious for obstructive sleep apnea    We will proceed with  Polysomnography    We discussed all pathologies related to untreated sleep apnea  Including but not limited to pulmonary hypertension, stroke, cardiovascular disease, difficulty losing weight, headaches, daytime somnolence, etc...  - Referral to Pulmonary and Sleep Medicine        Instructed to Follow up in clinic or ER for worsening symptoms, difficulty breathing, lack of expected recovery, or should new symptoms or problems arise.    Followup: Return in about 6 months (around 8/15/2022) for  Reevaluation, labs.

## 2022-11-09 ENCOUNTER — HOSPITAL ENCOUNTER (OUTPATIENT)
Facility: MEDICAL CENTER | Age: 47
End: 2022-11-09
Attending: STUDENT IN AN ORGANIZED HEALTH CARE EDUCATION/TRAINING PROGRAM
Payer: COMMERCIAL

## 2022-11-09 ENCOUNTER — OFFICE VISIT (OUTPATIENT)
Dept: URGENT CARE | Facility: PHYSICIAN GROUP | Age: 47
End: 2022-11-09
Payer: COMMERCIAL

## 2022-11-09 VITALS
DIASTOLIC BLOOD PRESSURE: 78 MMHG | RESPIRATION RATE: 18 BRPM | SYSTOLIC BLOOD PRESSURE: 132 MMHG | TEMPERATURE: 98.3 F | HEIGHT: 62 IN | WEIGHT: 185 LBS | BODY MASS INDEX: 34.04 KG/M2 | HEART RATE: 70 BPM | OXYGEN SATURATION: 93 %

## 2022-11-09 DIAGNOSIS — J45.901 MILD ASTHMA WITH EXACERBATION, UNSPECIFIED WHETHER PERSISTENT: ICD-10-CM

## 2022-11-09 DIAGNOSIS — J06.9 UPPER RESPIRATORY TRACT INFECTION, UNSPECIFIED TYPE: ICD-10-CM

## 2022-11-09 DIAGNOSIS — R05.9 COUGH, UNSPECIFIED TYPE: ICD-10-CM

## 2022-11-09 PROCEDURE — 99213 OFFICE O/P EST LOW 20 MIN: CPT | Performed by: STUDENT IN AN ORGANIZED HEALTH CARE EDUCATION/TRAINING PROGRAM

## 2022-11-09 PROCEDURE — 0240U HCHG SARS-COV-2 COVID-19 NFCT DS RESP RNA 3 TRGT MIC: CPT

## 2022-11-09 RX ORDER — METHYLPREDNISOLONE 4 MG/1
TABLET ORAL
Qty: 21 TABLET | Refills: 0 | Status: SHIPPED | OUTPATIENT
Start: 2022-11-09 | End: 2023-12-12

## 2022-11-09 RX ORDER — BENZONATATE 100 MG/1
100 CAPSULE ORAL 3 TIMES DAILY PRN
Qty: 60 CAPSULE | Refills: 0 | Status: SHIPPED | OUTPATIENT
Start: 2022-11-09 | End: 2023-12-12

## 2022-11-09 RX ORDER — GUAIFENESIN 600 MG/1
600 TABLET, EXTENDED RELEASE ORAL EVERY 12 HOURS
Qty: 28 TABLET | Refills: 0 | Status: SHIPPED | OUTPATIENT
Start: 2022-11-09 | End: 2022-11-23

## 2022-11-09 RX ORDER — ALBUTEROL SULFATE 90 UG/1
2 AEROSOL, METERED RESPIRATORY (INHALATION) EVERY 4 HOURS PRN
Qty: 8.5 G | Refills: 0 | Status: SHIPPED | OUTPATIENT
Start: 2022-11-09

## 2022-11-09 ASSESSMENT — FIBROSIS 4 INDEX: FIB4 SCORE: 0.61

## 2022-11-09 NOTE — PROGRESS NOTES
"Subjective     Jessica Ojeda is a 47 y.o. female who presents with Cough (X 2 days, Raspy cough,fatigue)            URI   This is a new problem. Episode onset: 2 days. There has been no fever. Associated symptoms include coughing, a sore throat and wheezing. Pertinent negatives include no abdominal pain, chest pain, congestion, ear pain, nausea, plugged ear sensation or vomiting.   Cough  This is a new problem. Associated symptoms include a sore throat and wheezing. Pertinent negatives include no chest pain, chills, ear pain, fever, myalgias, nasal congestion, postnasal drip or shortness of breath. Her past medical history is significant for asthma.     Review of Systems   Constitutional:  Positive for malaise/fatigue. Negative for chills and fever.   HENT:  Positive for sore throat. Negative for congestion, ear pain and postnasal drip.    Respiratory:  Positive for cough and wheezing. Negative for shortness of breath.    Cardiovascular:  Negative for chest pain.   Gastrointestinal:  Negative for abdominal pain, nausea and vomiting.   Musculoskeletal:  Negative for myalgias.            Objective     /78 (BP Location: Left arm, Patient Position: Sitting, BP Cuff Size: Adult)   Pulse 70   Temp 36.8 °C (98.3 °F) (Temporal)   Resp 18   Ht 1.575 m (5' 2\")   Wt 83.9 kg (185 lb)   SpO2 93%   BMI 33.84 kg/m²      Physical Exam  Vitals reviewed.   Constitutional:       Appearance: Normal appearance.   HENT:      Head: Normocephalic and atraumatic.      Nose: Nose normal.      Mouth/Throat:      Mouth: Mucous membranes are moist.      Pharynx: Oropharynx is clear.   Cardiovascular:      Rate and Rhythm: Normal rate and regular rhythm.   Pulmonary:      Effort: Pulmonary effort is normal.      Breath sounds: Normal breath sounds.   Skin:     General: Skin is warm and dry.   Neurological:      General: No focal deficit present.      Mental Status: She is alert. Mental status is at baseline.                     "       Assessment & Plan        1. Upper respiratory tract infection, unspecified type  - methylPREDNISolone (MEDROL DOSEPAK) 4 MG Tablet Therapy Pack; Per  directions on package  Dispense: 21 Tablet; Refill: 0  - albuterol (PROAIR HFA) 108 (90 Base) MCG/ACT Aero Soln inhalation aerosol; Inhale 2 Puffs every four hours as needed for Shortness of Breath.  Dispense: 8.5 g; Refill: 0  - CoV-2 and Flu A/B by PCR (24 hour In-House): Collect NP swab in [facility]; Future    2. Cough, unspecified type  - benzonatate (TESSALON) 100 MG Cap; Take 1 Capsule by mouth 3 times a day as needed for Cough.  Dispense: 60 Capsule; Refill: 0  - guaiFENesin ER (MUCINEX) 600 MG TABLET SR 12 HR; Take 1 Tablet by mouth every 12 hours for 14 days.  Dispense: 28 Tablet; Refill: 0    3. Mild asthma with exacerbation, unspecified whether persistent  - methylPREDNISolone (MEDROL DOSEPAK) 4 MG Tablet Therapy Pack; Per  directions on package  Dispense: 21 Tablet; Refill: 0  - albuterol (PROAIR HFA) 108 (90 Base) MCG/ACT Aero Soln inhalation aerosol; Inhale 2 Puffs every four hours as needed for Shortness of Breath.  Dispense: 8.5 g; Refill: 0     Symptoms most likely viral in etiology.  CoV-2 and Flu A/B by PCR collected.  Results will be available via My Chart.    Differential diagnoses, supportive care measures (rest, hydration, OTC medications, albuterol inhaler as needed, humidified air) and indications for immediate follow-up discussed with patient. Pathogenesis of diagnosis discussed including typical length and natural progression.      Instructed to return to urgent care or nearest emergency department if symptoms fail to improve, for any change in condition, further concerns, or new concerning symptoms.    Patient states understanding and agrees with the plan of care and discharge instructions.

## 2022-11-09 NOTE — LETTER
November 9, 2022    To Whom It May Concern:    This is confirmation that Jessica Ojeda attended her scheduled appointment with Brigette Spivey P.A.-C. on 11/09/22. Your employee was seen in our clinic today.  A concern for COVID-19/FLU has been identified and testing is in progress.    We are asking you to excuse absences while following self-isolation protocol per Center for Disease Control (CDC) guidelines.  Your employee will be able to access test results through our electronic delivery system called [employer].    If you have any questions please do not hesitate to call me at the phone number listed below.    Sincerely,    Brigette Spivey P.A.-C.  [phone]

## 2022-11-10 DIAGNOSIS — J06.9 UPPER RESPIRATORY TRACT INFECTION, UNSPECIFIED TYPE: ICD-10-CM

## 2022-11-10 LAB
FLUAV RNA SPEC QL NAA+PROBE: NEGATIVE
FLUBV RNA SPEC QL NAA+PROBE: NEGATIVE
SARS-COV-2 RNA RESP QL NAA+PROBE: NOTDETECTED
SPECIMEN SOURCE: NORMAL

## 2022-12-08 ASSESSMENT — ENCOUNTER SYMPTOMS
VOMITING: 0
CHILLS: 0
WHEEZING: 1
FEVER: 0
SORE THROAT: 1
MYALGIAS: 0
SHORTNESS OF BREATH: 0
ABDOMINAL PAIN: 0
NAUSEA: 0
COUGH: 1

## 2023-11-29 ENCOUNTER — TELEPHONE (OUTPATIENT)
Dept: MEDICAL GROUP | Age: 48
End: 2023-11-29
Payer: COMMERCIAL

## 2023-11-29 RX ORDER — PREDNISONE 20 MG/1
TABLET ORAL
Qty: 12 TABLET | Refills: 0 | Status: SHIPPED | OUTPATIENT
Start: 2023-11-29 | End: 2023-12-12

## 2023-11-29 RX ORDER — GUAIFENESIN 600 MG/1
600 TABLET, EXTENDED RELEASE ORAL EVERY 12 HOURS
Qty: 14 TABLET | Refills: 0 | Status: SHIPPED | OUTPATIENT
Start: 2023-11-29 | End: 2023-12-06

## 2023-11-29 NOTE — TELEPHONE ENCOUNTER
PT tested positive for COVID on Monday and is having symptoms. She wants to know if she can get a prescription to relive her symptoms.

## 2023-12-06 ENCOUNTER — APPOINTMENT (OUTPATIENT)
Dept: MEDICAL GROUP | Age: 48
End: 2023-12-06
Payer: COMMERCIAL

## 2023-12-12 ENCOUNTER — OFFICE VISIT (OUTPATIENT)
Dept: MEDICAL GROUP | Age: 48
End: 2023-12-12
Payer: COMMERCIAL

## 2023-12-12 VITALS
TEMPERATURE: 97.5 F | HEART RATE: 89 BPM | WEIGHT: 212 LBS | OXYGEN SATURATION: 95 % | BODY MASS INDEX: 39.01 KG/M2 | DIASTOLIC BLOOD PRESSURE: 92 MMHG | HEIGHT: 62 IN | SYSTOLIC BLOOD PRESSURE: 150 MMHG

## 2023-12-12 DIAGNOSIS — I10 ESSENTIAL HYPERTENSION: ICD-10-CM

## 2023-12-12 DIAGNOSIS — R87.612 LGSIL OF CERVIX OF UNDETERMINED SIGNIFICANCE: ICD-10-CM

## 2023-12-12 DIAGNOSIS — Z12.11 SCREENING FOR COLON CANCER: ICD-10-CM

## 2023-12-12 DIAGNOSIS — J06.9 UPPER RESPIRATORY TRACT INFECTION, UNSPECIFIED TYPE: ICD-10-CM

## 2023-12-12 PROCEDURE — 3077F SYST BP >= 140 MM HG: CPT | Performed by: FAMILY MEDICINE

## 2023-12-12 PROCEDURE — 99214 OFFICE O/P EST MOD 30 MIN: CPT | Performed by: FAMILY MEDICINE

## 2023-12-12 PROCEDURE — 3080F DIAST BP >= 90 MM HG: CPT | Performed by: FAMILY MEDICINE

## 2023-12-12 RX ORDER — LISINOPRIL 10 MG/1
10 TABLET ORAL DAILY
Qty: 90 TABLET | Refills: 0 | Status: SHIPPED | OUTPATIENT
Start: 2023-12-12 | End: 2024-03-07

## 2023-12-12 RX ORDER — BENZONATATE 100 MG/1
100 CAPSULE ORAL 3 TIMES DAILY PRN
Qty: 60 CAPSULE | Refills: 0 | Status: SHIPPED | OUTPATIENT
Start: 2023-12-12 | End: 2024-03-20

## 2023-12-12 ASSESSMENT — PATIENT HEALTH QUESTIONNAIRE - PHQ9: CLINICAL INTERPRETATION OF PHQ2 SCORE: 0

## 2023-12-12 NOTE — PROGRESS NOTES
This medical record contains text that has been entered with the assistance of computer voice recognition and dictation software.  Therefore, it may contain unintended errors in text, spelling, punctuation, or grammar      Chief Complaint   Patient presents with    Paperwork     Doctor note to return to work     Menopause     Early menopause symptoms     Referral Needed     Gynecologist          Jessica Ojeda is a 48 y.o. female here evaluation and management of: Routine follow-up      HPI:           1. Upper respiratory tract infection, unspecified type  Jessica is a very pleasant 48-year-old female who tested positive for COVID 2 weeks ago her symptoms were cough fever sore throat.  She states she feels better now she still has a lingering cough she also needs a note that she can return back to work.    2. Essential hypertension  Patient has a history of hypertension but she states that a couple years ago became normal so she stopped taking her medication.  Recently she has noticed that it has been elevated again.    3. Screening for colon cancer  Overdue for colon cancer screening    4. LGSIL of cervix of undetermined significance  The patient would like to be seen in gynecology    Current medicines (including changes today)  Current Outpatient Medications   Medication Sig Dispense Refill    benzonatate (TESSALON) 100 MG Cap Take 1 Capsule by mouth 3 times a day as needed for Cough. 60 Capsule 0    lisinopril (PRINIVIL) 10 MG Tab Take 1 Tablet by mouth every day. 90 Tablet 0    Ascorbic Acid 500 MG Cap TAKE 2 TABS PO TID 30 Capsule 0    albuterol (PROAIR HFA) 108 (90 Base) MCG/ACT Aero Soln inhalation aerosol Inhale 2 Puffs every four hours as needed for Shortness of Breath. 8.5 g 0     No current facility-administered medications for this visit.     She  has a past medical history of ASTHMA, Bronchitis, Hay fever, and Pneumonia.  She  has a past surgical history that includes pr breast augmentation with  "implant.  Social History     Tobacco Use    Smoking status: Former     Current packs/day: 0.00     Average packs/day: 0.3 packs/day for 25.0 years (6.3 ttl pk-yrs)     Types: Cigarettes     Start date: 1997     Quit date: 2022     Years since quittin.3    Smokeless tobacco: Former     Quit date: 2016   Vaping Use    Vaping Use: Never used   Substance Use Topics    Alcohol use: Not Currently     Comment: once a year    Drug use: No     Social History     Social History Narrative    Not on file     Family History   Problem Relation Age of Onset    Heart Disease Mother     Hypertension Mother     Other Mother         Kidney Disease    Diabetes Mother     Diabetes Brother     Other Maternal Grandmother         [place]'s Chorea     Family Status   Relation Name Status    Bro  Alive    Bro  Alive    Fa  Alive    Mo  Alive    Bro  Alive    MGMo      MGFa      PGMo      PGFa           ROS    The pertinent  ROS findings can be seen in the HPI above.     All other systems reviewed and are negative     Objective:     BP (!) 150/92 (BP Location: Left arm, Patient Position: Sitting, BP Cuff Size: Adult long)   Pulse 89   Temp 36.4 °C (97.5 °F) (Temporal)   Ht 1.575 m (5' 2\")   Wt 96.2 kg (212 lb)   SpO2 95%  Body mass index is 38.78 kg/m².      Physical Exam:    Constitutional: Alert, no distress.  Skin: No suspicious lesions  Eye: Equal, round and reactive, conjunctiva clear, lids normal.  ENMT: Lips without lesions, good dentition, oropharynx clear.  Neck: Trachea midline, no masses, no thyromegaly. No cervical or supraclavicular lymphadenopathy.  Respiratory: Unlabored respiratory effort, lungs clear to auscultation, no wheezes, no ronchi.  Cardiovascular: Normal S1, S2, no murmur, no edema  Abdomen: Soft, non-tender, no masses, no hepatosplenomegaly.        Assessment and Plan:   The following treatment plan was discussed    All recent labs and provider notes " reviewed    1. Upper respiratory tract infection, unspecified type    Overall she is recovering COVID the cough is lingering we discussed that can stay there for several weeks.  She is medically cleared to return to work    - benzonatate (TESSALON) 100 MG Cap; Take 1 Capsule by mouth 3 times a day as needed for Cough.  Dispense: 60 Capsule; Refill: 0    2. Essential hypertension    Begin lisinopril  Return to clinic with a 3 to 4-week BP log    - lisinopril (PRINIVIL) 10 MG Tab; Take 1 Tablet by mouth every day.  Dispense: 90 Tablet; Refill: 0    3. Screening for colon cancer  - Cologuard Colon Cancer Screening    4. LGSIL of cervix of undetermined significance  - Referral to Gynecology             Instructed to Follow up in clinic or ER for worsening symptoms, difficulty breathing, lack of expected recovery, or should new symptoms or problems arise.    Followup: Return in about 6 months (around 6/12/2024) for Reevaluation.

## 2023-12-12 NOTE — LETTER
December 12, 2023         Patient: Jessica Ojeda   YOB: 1975   Date of Visit: 12/12/2023           To Whom it May Concern:    Jessica Ojeda was seen in my clinic on 12/12/2023. She may return to work on thursday, 14/DEC/2023.    If you have any questions or concerns, please don't hesitate to call.        Very Respectfully,      Kb Correa MD  [facility]  ,    Clerkship Director

## 2024-03-06 DIAGNOSIS — I10 ESSENTIAL HYPERTENSION: ICD-10-CM

## 2024-03-06 NOTE — TELEPHONE ENCOUNTER
Received request via: Patient    Was the patient seen in the last year in this department? Yes    Does the patient have an active prescription (recently filled or refills available) for medication(s) requested? No    Pharmacy Name: [facility]     Does the patient have assisted Plus and need 100 day supply (blood pressure, diabetes and cholesterol meds only)? Patient does not have SCP

## 2024-03-07 RX ORDER — LISINOPRIL 10 MG/1
10 TABLET ORAL DAILY
Qty: 90 TABLET | Refills: 2 | Status: SHIPPED | OUTPATIENT
Start: 2024-03-07

## 2024-03-20 ENCOUNTER — OFFICE VISIT (OUTPATIENT)
Dept: MEDICAL GROUP | Age: 49
End: 2024-03-20
Payer: COMMERCIAL

## 2024-03-20 VITALS
WEIGHT: 213 LBS | SYSTOLIC BLOOD PRESSURE: 134 MMHG | DIASTOLIC BLOOD PRESSURE: 90 MMHG | OXYGEN SATURATION: 94 % | HEART RATE: 88 BPM | BODY MASS INDEX: 39.2 KG/M2 | TEMPERATURE: 97.8 F | HEIGHT: 62 IN

## 2024-03-20 DIAGNOSIS — E55.9 VITAMIN D DEFICIENCY: ICD-10-CM

## 2024-03-20 DIAGNOSIS — Z00.00 ANNUAL PHYSICAL EXAM: ICD-10-CM

## 2024-03-20 DIAGNOSIS — B35.2 TINEA MANUUM: ICD-10-CM

## 2024-03-20 DIAGNOSIS — E78.00 PURE HYPERCHOLESTEROLEMIA: ICD-10-CM

## 2024-03-20 DIAGNOSIS — R87.612 LGSIL OF CERVIX OF UNDETERMINED SIGNIFICANCE: ICD-10-CM

## 2024-03-20 DIAGNOSIS — Z12.11 SCREENING FOR COLON CANCER: ICD-10-CM

## 2024-03-20 PROBLEM — B35.9 RINGWORM: Status: ACTIVE | Noted: 2024-03-20

## 2024-03-20 PROCEDURE — 99214 OFFICE O/P EST MOD 30 MIN: CPT | Performed by: FAMILY MEDICINE

## 2024-03-20 PROCEDURE — 3080F DIAST BP >= 90 MM HG: CPT | Performed by: FAMILY MEDICINE

## 2024-03-20 PROCEDURE — 3075F SYST BP GE 130 - 139MM HG: CPT | Performed by: FAMILY MEDICINE

## 2024-03-20 RX ORDER — CLOTRIMAZOLE 1 %
1 CREAM (GRAM) TOPICAL 2 TIMES DAILY
Qty: 28 G | Refills: 0 | Status: SHIPPED | OUTPATIENT
Start: 2024-03-20

## 2024-03-20 ASSESSMENT — PATIENT HEALTH QUESTIONNAIRE - PHQ9: CLINICAL INTERPRETATION OF PHQ2 SCORE: 0

## 2024-03-20 NOTE — PROGRESS NOTES
This medical record contains text that has been entered with the assistance of computer voice recognition and dictation software.  Therefore, it may contain unintended errors in text, spelling, punctuation, or grammar      Chief Complaint   Patient presents with    Follow-Up     Check up BP has like dry skin in the palm of left hand , wants a referral to GI please          Jessica Ojeda is a 49 y.o. female here evaluation and management of: rash      HPI:           1. Ringworm  2. LGSIL of cervix of undetermined significance  3. Screening for colon cancer    History of Present Illness  The patient is a 49-year-old female who presents for evaluation of multiple medical concerns.    She has a lesion on the palm of her hand. It started as a tiny hole and over the last 2 to 3 weeks it has spread out and got bigger. Some days it stings. She thought she just cut it or it was a bug bite. She put some peroxide on it and it flared out. She has 3 dogs and 1 cat. She has not tried anything for the ringworm.    She has issues with getting lesions on her cervix and had to have them scraped out. She is overdue for a checkup. She is trying to get a gynecologist. She called a few times but did not get a phone call from them.      Current medicines (including changes today)  Current Outpatient Medications   Medication Sig Dispense Refill    clotrimazole (LOTRIMIN) 1 % Cream Apply 1 Application topically 2 times a day. 28 g 0    lisinopril (PRINIVIL) 10 MG Tab Take 1 tablet by mouth once daily 90 Tablet 2    Ascorbic Acid 500 MG Cap TAKE 2 TABS PO TID 30 Capsule 0    albuterol (PROAIR HFA) 108 (90 Base) MCG/ACT Aero Soln inhalation aerosol Inhale 2 Puffs every four hours as needed for Shortness of Breath. 8.5 g 0     No current facility-administered medications for this visit.     She  has a past medical history of ASTHMA, Bronchitis, Hay fever, and Pneumonia.  She  has a past surgical history that includes pr breast augmentation  "with implant.  Social History     Tobacco Use    Smoking status: Former     Current packs/day: 0.00     Average packs/day: 0.3 packs/day for 25.0 years (6.3 ttl pk-yrs)     Types: Cigarettes     Start date: 1997     Quit date: 2022     Years since quittin.6    Smokeless tobacco: Former     Quit date: 2016   Vaping Use    Vaping Use: Never used   Substance Use Topics    Alcohol use: Not Currently     Comment: once a year    Drug use: No     Social History     Social History Narrative    Not on file     Family History   Problem Relation Age of Onset    Heart Disease Mother     Hypertension Mother     Other Mother         Kidney Disease    Diabetes Mother     Diabetes Brother     Other Maternal Grandmother         [place]'s Chorea     Family Status   Relation Name Status    Bro  Alive    Bro  Alive    Fa  Alive    Mo  Alive    Bro  Alive    MGMo      MGFa      PGMo      PGFa           ROS    The pertinent  ROS findings can be seen in the HPI above.     All other systems reviewed and are negative     Objective:     BP (!) 134/90 (BP Location: Right arm, Patient Position: Sitting, BP Cuff Size: Large adult)   Pulse 88   Temp 36.6 °C (97.8 °F) (Temporal)   Ht 1.575 m (5' 2\")   Wt 96.6 kg (213 lb)   SpO2 94%  Body mass index is 38.96 kg/m².      Physical Exam:    Constitutional: Alert, no distress.  Skin: Well-demarcated flaky circular plaque on the left palm  Eye: Equal, round and reactive, conjunctiva clear, lids normal.  ENMT: Lips without lesions, good dentition, oropharynx clear.  Neck: Trachea midline, no masses, no thyromegaly. No cervical or supraclavicular lymphadenopathy.  Respiratory: Unlabored respiratory effort, lungs clear to auscultation, no wheezes, no ronchi.  Cardiovascular: Normal S1, S2, no murmur, no edema  Abdomen: Soft, non-tender, no masses, no hepatosplenomegaly.        Assessment and Plan:   The following treatment plan was discussed    All " recent labs and provider notes reviewed    1. Ringworm    We will begin with topical lotrim  If topical treatment fails we will proceed with systemic rx    - clotrimazole (LOTRIMIN) 1 % Cream; Apply 1 Application topically 2 times a day.  Dispense: 28 g; Refill: 0    2. LGSIL of cervix of undetermined significance  - Referral to Gynecology    3. Screening for colon cancer  - Referral to Gastroenterology    4. Pure hypercholesterolemia  - CBC WITH DIFFERENTIAL; Future  - Comp Metabolic Panel; Future  - Lipid Profile; Future  - TSH+FREE T4  - T3 FREE; Future  - VITAMIN D,25 HYDROXY (DEFICIENCY); Future  - HIV AG/AB COMBO ASSAY SCREENING; Future  - HEP C VIRUS ANTIBODY; Future    5. Annual physical exam  - CBC WITH DIFFERENTIAL; Future  - Comp Metabolic Panel; Future  - Lipid Profile; Future  - TSH+FREE T4  - T3 FREE; Future  - VITAMIN D,25 HYDROXY (DEFICIENCY); Future  - HIV AG/AB COMBO ASSAY SCREENING; Future  - HEP C VIRUS ANTIBODY; Future    6. Vitamin D deficiency  - VITAMIN D,25 HYDROXY (DEFICIENCY); Future             Instructed to Follow up in clinic or ER for worsening symptoms, difficulty breathing, lack of expected recovery, or should new symptoms or problems arise.    Followup: Return in about 3 months (around 6/20/2024) for Reevaluation, labs.

## 2024-04-01 ENCOUNTER — HOSPITAL ENCOUNTER (OUTPATIENT)
Dept: LAB | Facility: MEDICAL CENTER | Age: 49
End: 2024-04-01
Attending: FAMILY MEDICINE
Payer: COMMERCIAL

## 2024-04-01 DIAGNOSIS — E78.00 PURE HYPERCHOLESTEROLEMIA: ICD-10-CM

## 2024-04-01 DIAGNOSIS — Z00.00 ANNUAL PHYSICAL EXAM: ICD-10-CM

## 2024-04-01 DIAGNOSIS — E55.9 VITAMIN D DEFICIENCY: ICD-10-CM

## 2024-04-01 LAB
25(OH)D3 SERPL-MCNC: 23 NG/ML (ref 30–100)
ALBUMIN SERPL BCP-MCNC: 4.1 G/DL (ref 3.2–4.9)
ALBUMIN/GLOB SERPL: 1.3 G/DL
ALP SERPL-CCNC: 92 U/L (ref 30–99)
ALT SERPL-CCNC: 15 U/L (ref 2–50)
ANION GAP SERPL CALC-SCNC: 14 MMOL/L (ref 7–16)
AST SERPL-CCNC: 16 U/L (ref 12–45)
BASOPHILS # BLD AUTO: 0.8 % (ref 0–1.8)
BASOPHILS # BLD: 0.1 K/UL (ref 0–0.12)
BILIRUB SERPL-MCNC: 0.2 MG/DL (ref 0.1–1.5)
BUN SERPL-MCNC: 9 MG/DL (ref 8–22)
CALCIUM ALBUM COR SERPL-MCNC: 9 MG/DL (ref 8.5–10.5)
CALCIUM SERPL-MCNC: 9.1 MG/DL (ref 8.5–10.5)
CHLORIDE SERPL-SCNC: 102 MMOL/L (ref 96–112)
CHOLEST SERPL-MCNC: 216 MG/DL (ref 100–199)
CO2 SERPL-SCNC: 22 MMOL/L (ref 20–33)
CREAT SERPL-MCNC: 0.82 MG/DL (ref 0.5–1.4)
EOSINOPHIL # BLD AUTO: 0.3 K/UL (ref 0–0.51)
EOSINOPHIL NFR BLD: 2.3 % (ref 0–6.9)
ERYTHROCYTE [DISTWIDTH] IN BLOOD BY AUTOMATED COUNT: 45.1 FL (ref 35.9–50)
FASTING STATUS PATIENT QL REPORTED: NORMAL
GFR SERPLBLD CREATININE-BSD FMLA CKD-EPI: 88 ML/MIN/1.73 M 2
GLOBULIN SER CALC-MCNC: 3.1 G/DL (ref 1.9–3.5)
GLUCOSE SERPL-MCNC: 88 MG/DL (ref 65–99)
HCT VFR BLD AUTO: 48.1 % (ref 37–47)
HCV AB SER QL: NORMAL
HDLC SERPL-MCNC: 38 MG/DL
HGB BLD-MCNC: 16.2 G/DL (ref 12–16)
HIV 1+2 AB+HIV1 P24 AG SERPL QL IA: NORMAL
IMM GRANULOCYTES # BLD AUTO: 0.14 K/UL (ref 0–0.11)
IMM GRANULOCYTES NFR BLD AUTO: 1.1 % (ref 0–0.9)
LDLC SERPL CALC-MCNC: 132 MG/DL
LYMPHOCYTES # BLD AUTO: 3.42 K/UL (ref 1–4.8)
LYMPHOCYTES NFR BLD: 26.2 % (ref 22–41)
MCH RBC QN AUTO: 31 PG (ref 27–33)
MCHC RBC AUTO-ENTMCNC: 33.7 G/DL (ref 32.2–35.5)
MCV RBC AUTO: 92 FL (ref 81.4–97.8)
MONOCYTES # BLD AUTO: 0.61 K/UL (ref 0–0.85)
MONOCYTES NFR BLD AUTO: 4.7 % (ref 0–13.4)
NEUTROPHILS # BLD AUTO: 8.48 K/UL (ref 1.82–7.42)
NEUTROPHILS NFR BLD: 64.9 % (ref 44–72)
NRBC # BLD AUTO: 0 K/UL
NRBC BLD-RTO: 0 /100 WBC (ref 0–0.2)
PLATELET # BLD AUTO: 345 K/UL (ref 164–446)
PMV BLD AUTO: 10.3 FL (ref 9–12.9)
POTASSIUM SERPL-SCNC: 4.3 MMOL/L (ref 3.6–5.5)
PROT SERPL-MCNC: 7.2 G/DL (ref 6–8.2)
RBC # BLD AUTO: 5.23 M/UL (ref 4.2–5.4)
SODIUM SERPL-SCNC: 138 MMOL/L (ref 135–145)
T3FREE SERPL-MCNC: 3.17 PG/ML (ref 2–4.4)
T4 FREE SERPL-MCNC: 1.31 NG/DL (ref 0.93–1.7)
TRIGL SERPL-MCNC: 231 MG/DL (ref 0–149)
TSH SERPL DL<=0.005 MIU/L-ACNC: 1.25 UIU/ML (ref 0.38–5.33)
WBC # BLD AUTO: 13.1 K/UL (ref 4.8–10.8)

## 2024-04-01 PROCEDURE — 84443 ASSAY THYROID STIM HORMONE: CPT

## 2024-04-01 PROCEDURE — 80061 LIPID PANEL: CPT

## 2024-04-01 PROCEDURE — 86803 HEPATITIS C AB TEST: CPT

## 2024-04-01 PROCEDURE — 87389 HIV-1 AG W/HIV-1&-2 AB AG IA: CPT

## 2024-04-01 PROCEDURE — 84439 ASSAY OF FREE THYROXINE: CPT

## 2024-04-01 PROCEDURE — 36415 COLL VENOUS BLD VENIPUNCTURE: CPT

## 2024-04-01 PROCEDURE — 82306 VITAMIN D 25 HYDROXY: CPT

## 2024-04-01 PROCEDURE — 80053 COMPREHEN METABOLIC PANEL: CPT

## 2024-04-01 PROCEDURE — 85025 COMPLETE CBC W/AUTO DIFF WBC: CPT

## 2024-04-01 PROCEDURE — 84481 FREE ASSAY (FT-3): CPT

## 2024-06-10 ENCOUNTER — APPOINTMENT (OUTPATIENT)
Dept: OBGYN | Facility: CLINIC | Age: 49
End: 2024-06-10
Payer: COMMERCIAL

## 2025-06-26 ENCOUNTER — OFFICE VISIT (OUTPATIENT)
Dept: MEDICAL GROUP | Age: 50
End: 2025-06-26

## 2025-06-26 VITALS
HEART RATE: 69 BPM | HEIGHT: 63 IN | BODY MASS INDEX: 39.34 KG/M2 | WEIGHT: 222 LBS | OXYGEN SATURATION: 96 % | SYSTOLIC BLOOD PRESSURE: 168 MMHG | TEMPERATURE: 97.9 F | DIASTOLIC BLOOD PRESSURE: 100 MMHG

## 2025-06-26 DIAGNOSIS — Z12.11 SCREENING FOR COLON CANCER: Primary | ICD-10-CM

## 2025-06-26 DIAGNOSIS — N95.1 HOT FLASHES DUE TO MENOPAUSE: ICD-10-CM

## 2025-06-26 DIAGNOSIS — Z12.31 ENCOUNTER FOR SCREENING MAMMOGRAM FOR BREAST CANCER: ICD-10-CM

## 2025-06-26 DIAGNOSIS — L30.9 DERMATITIS: ICD-10-CM

## 2025-06-26 DIAGNOSIS — I10 ESSENTIAL HYPERTENSION: ICD-10-CM

## 2025-06-26 PROCEDURE — 99214 OFFICE O/P EST MOD 30 MIN: CPT | Performed by: FAMILY MEDICINE

## 2025-06-26 PROCEDURE — 3080F DIAST BP >= 90 MM HG: CPT | Performed by: FAMILY MEDICINE

## 2025-06-26 PROCEDURE — 3077F SYST BP >= 140 MM HG: CPT | Performed by: FAMILY MEDICINE

## 2025-06-26 RX ORDER — CITALOPRAM HYDROBROMIDE 20 MG/1
TABLET ORAL
Qty: 90 TABLET | Refills: 0 | Status: SHIPPED | OUTPATIENT
Start: 2025-06-26

## 2025-06-26 RX ORDER — TRIAMCINOLONE ACETONIDE 1 MG/G
CREAM TOPICAL
Qty: 45 G | Refills: 2 | Status: SHIPPED | OUTPATIENT
Start: 2025-06-26

## 2025-06-26 RX ORDER — LISINOPRIL AND HYDROCHLOROTHIAZIDE 20; 25 MG/1; MG/1
1 TABLET ORAL DAILY
Qty: 100 TABLET | Refills: 3 | Status: SHIPPED | OUTPATIENT
Start: 2025-06-26 | End: 2026-07-31

## 2025-06-26 ASSESSMENT — FIBROSIS 4 INDEX: FIB4 SCORE: 0.59

## 2025-06-26 ASSESSMENT — PATIENT HEALTH QUESTIONNAIRE - PHQ9
CLINICAL INTERPRETATION OF PHQ2 SCORE: 6
5. POOR APPETITE OR OVEREATING: 3 - NEARLY EVERY DAY
SUM OF ALL RESPONSES TO PHQ QUESTIONS 1-9: 26

## 2025-06-26 NOTE — PROGRESS NOTES
This medical record contains text that has been entered with the assistance of computer voice recognition and dictation software.  Therefore, it may contain unintended errors in text, spelling, punctuation, or grammar      Verbal consent was acquired by the patient to use BPT ambient listening note generation during this visit Yes       Chief Complaint   Patient presents with    Hypertension Follow-up     BP concern;; Tuesday pt states that she was having chest pain and tightness at work and was rushed via ambulance to [facility] and had multiple test done and BP was extremely high         Jessica Ojeda is a 50 y.o. female here evaluation and management of: ER follow up      HPI:     1. Essential hypertension      2. Dermatitis      3. Hot flashes due to menopause      4. Screening for colon cancer      5. Encounter for screening mammogram for breast cancer        History of Present Illness  The patient, a 50-year-old female, presents for evaluation of hypertension, dermatological concerns, and menopausal symptoms.    Hypertension  She recently sought emergency medical attention due to markedly elevated blood pressure 220/150. The differential diagnosis included myocardial infarction, which was subsequently excluded following diagnostic investigations. Although her blood pressure remains elevated, there has been improvement since the emergency department visit. The patient has ceased smoking and utilizes a home blood pressure monitor for regular assessments.  - Onset: Recently sought emergency medical attention.  - Character: Markedly elevated blood pressure.  - Alleviating/Aggravating Factors: Ceased smoking; uses home blood pressure monitor.  - Severity: Improvement since the emergency department visit.    Menopausal Symptoms  The patient reports experiencing depressive symptoms, which she attributes to menopausal transition. She describes severe vasomotor symptoms, including hot flashes, and cognitive  impairment, both of which are adversely affecting her occupational performance. Additionally, she reports frequent episodes of crying. These symptoms have been present intermittently for over a year, with notable intensification in the past year. She experiences 4 to 5 unpredictable hot flashes daily. Her last menstrual period occurred in 2022.  - Onset: Symptoms present intermittently for over a year, with notable intensification in the past year.  - Duration: Intermittent for over a year.  - Character: Depressive symptoms, severe vasomotor symptoms including hot flashes, cognitive impairment, frequent episodes of crying.  - Timin to 5 unpredictable hot flashes daily.  - Severity: Adversely affecting occupational performance.    Dermatological Concerns  The patient was previously prescribed a topical corticosteroid cream for a dermatological condition, but she discontinued its use due to perceived exacerbation of the symptoms. Currently, the condition is not severe but occasionally flares up, presenting as erythematous and pruritic lesions.  - Onset: Discontinued topical corticosteroid cream due to perceived exacerbation.  - Character: Erythematous and pruritic lesions.  - Severity: Condition is not severe but occasionally flares up.    GYNECOLOGICAL HISTORY:  - Last Menstrual Period: 2022    SOCIAL HISTORY  She has stopped smoking.           Results           Current medicines (including changes today)  Current Medications[1]  She  has a past medical history of ASTHMA, Bronchitis, Hay fever, and Pneumonia.  She  has a past surgical history that includes pr breast augmentation with implant.  Social History[2]  Social History     Social History Narrative    Not on file     Family History   Problem Relation Age of Onset    Heart Disease Mother     Hypertension Mother     Other Mother         Kidney Disease    Diabetes Mother     Diabetes Brother     Other Maternal Grandmother          "[place]'s Chorea     Family Status   Relation Name Status    Bro  Alive    Bro  Alive    Fa  Alive    Mo  Alive    Bro  Alive    MGMo      MGFa      PGMo      PGFa     No partnership data on file         ROS    The pertinent  ROS findings can be seen in the HPI above.     All other systems reviewed and are negative     Objective:     BP (!) 168/100 (BP Location: Left arm, Patient Position: Sitting, BP Cuff Size: Large adult long)   Pulse 69   Temp 36.6 °C (97.9 °F) (Temporal)   Ht 1.588 m (5' 2.5\")   Wt 101 kg (222 lb)   SpO2 96%  Body mass index is 39.96 kg/m².      Physical Exam:    Constitutional: Alert, no distress.  Skin: No suspicious lesions  Eye: Equal, round and reactive, conjunctiva clear, lids normal.  ENMT: Lips without lesions, good dentition, oropharynx clear.  Neck: Trachea midline, no masses, no thyromegaly. No cervical or supraclavicular lymphadenopathy.  Respiratory: Unlabored respiratory effort, lungs clear to auscultation, no wheezes, no ronchi.  Cardiovascular: Normal S1, S2, no murmur, no edema  Abdomen: Soft, non-tender, no masses, no hepatosplenomegaly.      Assessment and Plan:   The following treatment plan was discussed    All recent labs and provider notes reviewed      Assessment & Plan  Hypertension  Blood pressure remains elevated, though improved from ER visit; still at stroke level.  Treatment plan: Current medication lisinopril is not effective; transitioning to Prinzide (lisinopril and hydrochlorothiazide). Prescription for Prinzide sent to pharmacy; advised to maintain a 4-week blood pressure log at home. Blood pressure readings at home will be compared with clinic readings at next visit.    Menopausal symptoms  Reports hot flashes, brain fog, and depression for over a year, worsening in the past year.  Treatment plan: Celexa prescribed to manage hot flashes, depression, and anxiety. Prescription for Celexa sent to pharmacy.    Skin " condition  Previous cream exacerbated the condition; now presenting with intermittent flare-ups and itching.  Treatment plan: Prescribing a potent steroid cream for topical application, limited to 2-week usage periods. Referral to dermatology for further evaluation and management. Advised to use steroid cream for 2 weeks, followed by a break, if needed.    Health maintenance  Referral for colonoscopy provided. Advised to schedule a mammogram.    Follow-up: Follow-up appointment scheduled in 4 weeks to assess blood pressure and response to new medications.     1. Essential hypertension  - lisinopril-hydrochlorothiazide (PRINZIDE) 20-25 MG per tablet; Take 1 Tablet by mouth every day.  Dispense: 100 Tablet; Refill: 3    2. Dermatitis  - Referral to Dermatology    3. Hot flashes due to menopause  - citalopram (CELEXA) 20 MG Tab; TAKE 1/2 TAB PO Q24H FOR 7 DAYS THEN CONTINUE WITH ONE TAB PO Q24H  Dispense: 90 Tablet; Refill: 0    4. Screening for colon cancer  - Referral to GI for Colonoscopy    5. Encounter for screening mammogram for breast cancer  - MA-SCREENING MAMMO BILAT W/TOMOSYNTHESIS W/CAD; Future    Other orders  - Multiple Vitamins-Minerals (MULTIVITAMIN WOMEN PO); Take  by mouth.  - triamcinolone acetonide (KENALOG) 0.1 % Cream; AAA BID up to 14 days  Dispense: 45 g; Refill: 2             Instructed to Follow up in clinic or ER for worsening symptoms, difficulty breathing, lack of expected recovery, or should new symptoms or problems arise.    Followup: Return in about 4 weeks (around 7/24/2025) for 3-4 week BP log.                    [1]   Current Outpatient Medications   Medication Sig Dispense Refill    Multiple Vitamins-Minerals (MULTIVITAMIN WOMEN PO) Take  by mouth.      lisinopril-hydrochlorothiazide (PRINZIDE) 20-25 MG per tablet Take 1 Tablet by mouth every day. 100 Tablet 3    triamcinolone acetonide (KENALOG) 0.1 % Cream AAA BID up to 14 days 45 g 2    citalopram (CELEXA) 20 MG Tab TAKE 1/2 TAB PO  Q24H FOR 7 DAYS THEN CONTINUE WITH ONE TAB PO Q24H 90 Tablet 0    lisinopril (PRINIVIL) 10 MG Tab Take 1 tablet by mouth once daily 90 Tablet 2    Ascorbic Acid 500 MG Cap TAKE 2 TABS PO TID 30 Capsule 0    albuterol (PROAIR HFA) 108 (90 Base) MCG/ACT Aero Soln inhalation aerosol Inhale 2 Puffs every four hours as needed for Shortness of Breath. 8.5 g 0    clotrimazole (LOTRIMIN) 1 % Cream Apply 1 Application topically 2 times a day. 28 g 0     No current facility-administered medications for this visit.   [2]   Social History  Tobacco Use    Smoking status: Former     Current packs/day: 0.00     Average packs/day: 0.3 packs/day for 25.0 years (6.3 ttl pk-yrs)     Types: Cigarettes     Start date: 1997     Quit date: 2022     Years since quittin.8    Smokeless tobacco: Former     Quit date: 2016   Vaping Use    Vaping status: Every Day    Substances: Nicotine, Flavoring    Devices: Disposable   Substance Use Topics    Alcohol use: Not Currently     Comment: special occasions    Drug use: No

## 2025-07-03 NOTE — Clinical Note
REFERRAL APPROVAL NOTICE         Sent on July 3, 2025                   Crystal WEIR Rusty  [address] [place] [address]                   Dear Ms. Ojeda,    After a careful review of the medical information and benefit coverage, [facility] has processed your referral. See below for additional details.    If applicable, you must be actively enrolled with your insurance for coverage of the authorized service. If you have any questions regarding your coverage, please contact your insurance directly.    REFERRAL INFORMATION   Referral #:  [id number]  Referred-To Department    Referred-By Provider:  Dermatology    Kb Correa M.D.   [facility]      [address] [place] [address]  [phone] [address]  [place] [id number] [phone]    Referral Start Date:  06/26/2025  Referral End Date:   06/26/2026             SCHEDULING  If you do not already have an appointment, please call [phone] to make an appointment.     MORE INFORMATION  If you do not already have a [employer] account, sign up at: [email or web address]  You can access your medical information, make appointments, see lab results, billing information, and more.  If you have questions regarding this referral, please contact  the [facility] Referrals department at:             [phone]. Monday - Friday 8:00AM - 5:00PM.     Sincerely,    [facility]

## 2025-07-03 NOTE — Clinical Note
REFERRAL APPROVAL NOTICE         Sent on July 3, 2025                   Crystal WE[place]y  [address] [place] [address]                   Dear Ms. Ojeda,    After a careful review of the medical information and benefit coverage, [facility] has processed your referral. See below for additional details.    If applicable, you must be actively enrolled with your insurance for coverage of the authorized service. If you have any questions regarding your coverage, please contact your insurance directly.    REFERRAL INFORMATION   Referral #:  [id number]  Referred-To Provider    Referred-By Provider:  Gastroenterology    Kb Correa M.D.   [facility]      [address] [place] [id number] [phone] [address] [place] [address]  [place] [address]  [phone]    Referral Start Date:  06/26/2025  Referral End Date:   06/26/2026             SCHEDULING  If you do not already have an appointment, please call [phone] to make an appointment.     MORE INFORMATION  If you do not already have a [employer] account, sign up at: [email or web address]  You can access your medical information, make appointments, see lab results, billing information, and more.  If you have questions regarding this referral, please contact  the [facility] Referrals department at:             [phone]. Monday - Friday 8:00AM - 5:00PM.     Sincerely,    [facility]

## 2025-07-29 ENCOUNTER — APPOINTMENT (OUTPATIENT)
Dept: MEDICAL GROUP | Age: 50
End: 2025-07-29

## 2025-08-29 ENCOUNTER — APPOINTMENT (OUTPATIENT)
Dept: MEDICAL GROUP | Age: 50
End: 2025-08-29